# Patient Record
Sex: FEMALE | Race: BLACK OR AFRICAN AMERICAN | NOT HISPANIC OR LATINO | Employment: OTHER | URBAN - NONMETROPOLITAN AREA
[De-identification: names, ages, dates, MRNs, and addresses within clinical notes are randomized per-mention and may not be internally consistent; named-entity substitution may affect disease eponyms.]

---

## 2017-04-20 ENCOUNTER — APPOINTMENT (OUTPATIENT)
Dept: GENERAL RADIOLOGY | Facility: HOSPITAL | Age: 57
End: 2017-04-20

## 2017-04-20 ENCOUNTER — HOSPITAL ENCOUNTER (EMERGENCY)
Facility: HOSPITAL | Age: 57
Discharge: HOME | End: 2017-04-20
Attending: EMERGENCY MEDICINE | Admitting: EMERGENCY MEDICINE

## 2017-04-20 VITALS
TEMPERATURE: 97.6 F | HEART RATE: 47 BPM | WEIGHT: 169.5 LBS | DIASTOLIC BLOOD PRESSURE: 85 MMHG | BODY MASS INDEX: 28.24 KG/M2 | OXYGEN SATURATION: 100 % | RESPIRATION RATE: 18 BRPM | HEIGHT: 65 IN | SYSTOLIC BLOOD PRESSURE: 135 MMHG

## 2017-04-20 DIAGNOSIS — S39.012A BACK STRAIN, INITIAL ENCOUNTER: ICD-10-CM

## 2017-04-20 DIAGNOSIS — M54.6 ACUTE MIDLINE THORACIC BACK PAIN: Primary | ICD-10-CM

## 2017-04-20 DIAGNOSIS — R07.9 CHEST PAIN, UNSPECIFIED TYPE: ICD-10-CM

## 2017-04-20 LAB
ALBUMIN SERPL-MCNC: 4.4 G/DL (ref 3.4–4.8)
ALBUMIN/GLOB SERPL: 1.4 G/DL (ref 1.1–1.8)
ALP SERPL-CCNC: 89 U/L (ref 38–126)
ALT SERPL W P-5'-P-CCNC: 31 U/L (ref 9–52)
ANION GAP SERPL CALCULATED.3IONS-SCNC: 13 MMOL/L (ref 5–15)
AST SERPL-CCNC: 31 U/L (ref 14–36)
BASOPHILS # BLD AUTO: 0.04 10*3/MM3 (ref 0–0.2)
BASOPHILS NFR BLD AUTO: 0.6 % (ref 0–2)
BILIRUB SERPL-MCNC: 0.6 MG/DL (ref 0.2–1.3)
BUN BLD-MCNC: 16 MG/DL (ref 7–21)
BUN/CREAT SERPL: 21.1 (ref 7–25)
CALCIUM SPEC-SCNC: 8.7 MG/DL (ref 8.4–10.2)
CHLORIDE SERPL-SCNC: 105 MMOL/L (ref 95–110)
CK MB SERPL-CCNC: 0.47 NG/ML (ref 0–5)
CK SERPL-CCNC: 77 U/L (ref 30–135)
CO2 SERPL-SCNC: 20 MMOL/L (ref 22–31)
CREAT BLD-MCNC: 0.76 MG/DL (ref 0.5–1)
D-DIMER, QUANTITATIVE (MAD,POW, STR): 384 NG/ML (FEU) (ref 0–470)
DEPRECATED RDW RBC AUTO: 42.3 FL (ref 36.4–46.3)
EOSINOPHIL # BLD AUTO: 0.29 10*3/MM3 (ref 0–0.7)
EOSINOPHIL NFR BLD AUTO: 4.6 % (ref 0–7)
ERYTHROCYTE [DISTWIDTH] IN BLOOD BY AUTOMATED COUNT: 12.4 % (ref 11.5–14.5)
GFR SERPL CREATININE-BSD FRML MDRD: 96 ML/MIN/1.73 (ref 51–120)
GLOBULIN UR ELPH-MCNC: 3.2 GM/DL (ref 2.3–3.5)
GLUCOSE BLD-MCNC: 178 MG/DL (ref 60–100)
HCG SERPL QL: NEGATIVE
HCT VFR BLD AUTO: 39.5 % (ref 35–45)
HGB BLD-MCNC: 13.4 G/DL (ref 12–15.5)
HOLD SPECIMEN: NORMAL
HOLD SPECIMEN: NORMAL
IMM GRANULOCYTES # BLD: 0.01 10*3/MM3 (ref 0–0.02)
IMM GRANULOCYTES NFR BLD: 0.2 % (ref 0–0.5)
LYMPHOCYTES # BLD AUTO: 2.65 10*3/MM3 (ref 0.6–4.2)
LYMPHOCYTES NFR BLD AUTO: 42.2 % (ref 10–50)
MCH RBC QN AUTO: 31.9 PG (ref 26.5–34)
MCHC RBC AUTO-ENTMCNC: 33.9 G/DL (ref 31.4–36)
MCV RBC AUTO: 94 FL (ref 80–98)
MONOCYTES # BLD AUTO: 0.35 10*3/MM3 (ref 0–0.9)
MONOCYTES NFR BLD AUTO: 5.6 % (ref 0–12)
NEUTROPHILS # BLD AUTO: 2.94 10*3/MM3 (ref 2–8.6)
NEUTROPHILS NFR BLD AUTO: 46.8 % (ref 37–80)
NRBC BLD MANUAL-RTO: 0 /100 WBC (ref 0–0)
NT-PROBNP SERPL-MCNC: 35.9 PG/ML (ref 0–900)
PLATELET # BLD AUTO: 383 10*3/MM3 (ref 150–450)
PMV BLD AUTO: 9.7 FL (ref 8–12)
POTASSIUM BLD-SCNC: 3.4 MMOL/L (ref 3.5–5.1)
PROT SERPL-MCNC: 7.6 G/DL (ref 6.3–8.6)
RBC # BLD AUTO: 4.2 10*6/MM3 (ref 3.77–5.16)
SODIUM BLD-SCNC: 138 MMOL/L (ref 137–145)
TROPONIN I SERPL-MCNC: <0.012 NG/ML
TROPONIN I SERPL-MCNC: <0.012 NG/ML
WBC NRBC COR # BLD: 6.28 10*3/MM3 (ref 3.2–9.8)
WHOLE BLOOD HOLD SPECIMEN: NORMAL
WHOLE BLOOD HOLD SPECIMEN: NORMAL

## 2017-04-20 PROCEDURE — 99284 EMERGENCY DEPT VISIT MOD MDM: CPT

## 2017-04-20 PROCEDURE — 96374 THER/PROPH/DIAG INJ IV PUSH: CPT

## 2017-04-20 PROCEDURE — 83880 ASSAY OF NATRIURETIC PEPTIDE: CPT | Performed by: EMERGENCY MEDICINE

## 2017-04-20 PROCEDURE — 85025 COMPLETE CBC W/AUTO DIFF WBC: CPT | Performed by: EMERGENCY MEDICINE

## 2017-04-20 PROCEDURE — 96375 TX/PRO/DX INJ NEW DRUG ADDON: CPT

## 2017-04-20 PROCEDURE — 84703 CHORIONIC GONADOTROPIN ASSAY: CPT | Performed by: EMERGENCY MEDICINE

## 2017-04-20 PROCEDURE — 25010000002 KETOROLAC TROMETHAMINE PER 15 MG: Performed by: EMERGENCY MEDICINE

## 2017-04-20 PROCEDURE — 80053 COMPREHEN METABOLIC PANEL: CPT | Performed by: EMERGENCY MEDICINE

## 2017-04-20 PROCEDURE — 71020 HC CHEST PA AND LATERAL: CPT

## 2017-04-20 PROCEDURE — 82553 CREATINE MB FRACTION: CPT | Performed by: EMERGENCY MEDICINE

## 2017-04-20 PROCEDURE — 36415 COLL VENOUS BLD VENIPUNCTURE: CPT

## 2017-04-20 PROCEDURE — 25010000002 MORPHINE PER 10 MG: Performed by: EMERGENCY MEDICINE

## 2017-04-20 PROCEDURE — 25010000002 ONDANSETRON PER 1 MG: Performed by: EMERGENCY MEDICINE

## 2017-04-20 PROCEDURE — 82550 ASSAY OF CK (CPK): CPT | Performed by: EMERGENCY MEDICINE

## 2017-04-20 PROCEDURE — 93010 ELECTROCARDIOGRAM REPORT: CPT | Performed by: INTERNAL MEDICINE

## 2017-04-20 PROCEDURE — 85379 FIBRIN DEGRADATION QUANT: CPT | Performed by: EMERGENCY MEDICINE

## 2017-04-20 PROCEDURE — 84484 ASSAY OF TROPONIN QUANT: CPT | Performed by: EMERGENCY MEDICINE

## 2017-04-20 PROCEDURE — 93005 ELECTROCARDIOGRAM TRACING: CPT | Performed by: EMERGENCY MEDICINE

## 2017-04-20 PROCEDURE — 72072 X-RAY EXAM THORAC SPINE 3VWS: CPT

## 2017-04-20 RX ORDER — KETOROLAC TROMETHAMINE 30 MG/ML
30 INJECTION, SOLUTION INTRAMUSCULAR; INTRAVENOUS ONCE
Status: COMPLETED | OUTPATIENT
Start: 2017-04-20 | End: 2017-04-20

## 2017-04-20 RX ORDER — CYCLOBENZAPRINE HCL 10 MG
10 TABLET ORAL 3 TIMES DAILY PRN
Qty: 15 TABLET | Refills: 0 | Status: SHIPPED | OUTPATIENT
Start: 2017-04-20 | End: 2017-06-21

## 2017-04-20 RX ORDER — HYDROCODONE BITARTRATE AND ACETAMINOPHEN 5; 325 MG/1; MG/1
1 TABLET ORAL EVERY 6 HOURS PRN
Qty: 15 TABLET | Refills: 0 | Status: SHIPPED | OUTPATIENT
Start: 2017-04-20 | End: 2017-06-21

## 2017-04-20 RX ORDER — IBUPROFEN 600 MG/1
600 TABLET ORAL EVERY 8 HOURS PRN
Qty: 15 TABLET | Refills: 0 | Status: SHIPPED | OUTPATIENT
Start: 2017-04-20 | End: 2017-11-30

## 2017-04-20 RX ORDER — SODIUM CHLORIDE 0.9 % (FLUSH) 0.9 %
10 SYRINGE (ML) INJECTION AS NEEDED
Status: DISCONTINUED | OUTPATIENT
Start: 2017-04-20 | End: 2017-04-20 | Stop reason: HOSPADM

## 2017-04-20 RX ORDER — MORPHINE SULFATE 4 MG/ML
4 INJECTION, SOLUTION INTRAMUSCULAR; INTRAVENOUS ONCE
Status: COMPLETED | OUTPATIENT
Start: 2017-04-20 | End: 2017-04-20

## 2017-04-20 RX ORDER — LISINOPRIL 5 MG/1
10 TABLET ORAL DAILY
COMMUNITY

## 2017-04-20 RX ORDER — ONDANSETRON 2 MG/ML
4 INJECTION INTRAMUSCULAR; INTRAVENOUS ONCE
Status: COMPLETED | OUTPATIENT
Start: 2017-04-20 | End: 2017-04-20

## 2017-04-20 RX ORDER — ASPIRIN 325 MG
325 TABLET ORAL ONCE
Status: COMPLETED | OUTPATIENT
Start: 2017-04-20 | End: 2017-04-20

## 2017-04-20 RX ADMIN — KETOROLAC TROMETHAMINE 30 MG: 30 INJECTION, SOLUTION INTRAMUSCULAR; INTRAVENOUS at 10:34

## 2017-04-20 RX ADMIN — ASPIRIN 325 MG: 325 TABLET, COATED ORAL at 08:52

## 2017-04-20 RX ADMIN — MORPHINE SULFATE 4 MG: 4 INJECTION, SOLUTION INTRAMUSCULAR; INTRAVENOUS at 10:35

## 2017-04-20 RX ADMIN — ONDANSETRON 4 MG: 2 INJECTION INTRAMUSCULAR; INTRAVENOUS at 10:33

## 2017-04-20 NOTE — ED PROVIDER NOTES
Subjective   HPI Comments: Patient presents with two days of Upper T spine discomfort.  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  Yesterday morning the pain was more intense, patient notes she couldn't move secondary to the pain and worsening with movement.  She noted tightness in her chest and shortness of breath gradually with the pain to the point she thought she was having a heart attack.  Patient notes symptoms improved, but has had mild symptoms today as well.  No f/c.  Has had some associated nausea.  No known ASCAD.        History provided by:  Patient      Review of Systems   Constitutional: Negative.  Negative for appetite change, chills and fever.   HENT: Negative.  Negative for congestion.    Eyes: Negative.  Negative for photophobia and visual disturbance.   Respiratory: Positive for chest tightness and shortness of breath. Negative for cough.    Cardiovascular: Positive for chest pain. Negative for palpitations.   Gastrointestinal: Positive for nausea. Negative for abdominal pain, constipation, diarrhea and vomiting.   Endocrine: Negative.    Genitourinary: Negative.  Negative for decreased urine volume, dysuria, flank pain and hematuria.   Musculoskeletal: Positive for back pain (upper T spine). Negative for arthralgias, myalgias, neck pain and neck stiffness.   Skin: Negative.  Negative for pallor.   Neurological: Negative.  Negative for dizziness, syncope, weakness, light-headedness, numbness and headaches.   Psychiatric/Behavioral: Negative.  Negative for confusion and suicidal ideas. The patient is not nervous/anxious.        Past Medical History:   Diagnosis Date   • Asthma    • Hypertension        No Known Allergies    Past Surgical History:   Procedure Laterality Date   • NECK SURGERY      fusion   • TOE SURGERY     • TUBAL ABDOMINAL LIGATION         History reviewed. No pertinent family history.    Social History      Social History   • Marital status: Single     Spouse name: N/A   • Number of children: N/A   • Years of education: N/A     Social History Main Topics   • Smoking status: Light Tobacco Smoker   • Smokeless tobacco: None      Comment: 3/4 a day   • Alcohol use No   • Drug use: Yes     Special: Marijuana      Comment: occassional    • Sexual activity: Defer     Other Topics Concern   • None     Social History Narrative   • None           Objective   Physical Exam   Constitutional: She is oriented to person, place, and time. She appears well-developed and well-nourished. No distress.   HENT:   Head: Normocephalic and atraumatic.   Nose: Nose normal.   Mouth/Throat: Oropharynx is clear and moist.   Eyes: Conjunctivae and EOM are normal. No scleral icterus.   Neck: Normal range of motion. Neck supple. No JVD present.   Cardiovascular: Normal rate, regular rhythm, normal heart sounds and intact distal pulses.  Exam reveals no gallop and no friction rub.    No murmur heard.  Pulmonary/Chest: Effort normal. No respiratory distress. She has no wheezes. She has no rales. She exhibits no tenderness.   Abdominal: Soft. She exhibits no distension and no mass. There is no tenderness. There is no rebound and no guarding.   Musculoskeletal: She exhibits no edema, tenderness or deformity.   Reproducible back pain to the upper throacic spine.  TTP, midline.     Lymphadenopathy:     She has no cervical adenopathy.   Neurological: She is alert and oriented to person, place, and time. No cranial nerve deficit. She exhibits normal muscle tone.   Skin: Skin is warm and dry. No rash noted. She is not diaphoretic. No erythema. No pallor.   Psychiatric: She has a normal mood and affect. Her behavior is normal. Judgment and thought content normal.   Nursing note and vitals reviewed.      ECG 12 Lead    Date/Time: 4/20/2017 8:21 AM  Performed by: LADARIUS PEARSON  Authorized by: LADARIUS PEARSON   Conduction: right bundle branch  block  Clinical impression: abnormal ECG  Comments: Diffuse T wave inversion.  No ST elevation.                 ED Course  ED Course      Labs Reviewed   COMPREHENSIVE METABOLIC PANEL - Abnormal; Notable for the following:        Result Value    Glucose 178 (*)     Potassium 3.4 (*)     CO2 20.0 (*)     All other components within normal limits   TROPONIN (IN-HOUSE) - Normal   TROPONIN (IN-HOUSE) - Normal   BNP (IN-HOUSE) - Normal   HCG, SERUM, QUALITATIVE - Normal   CBC WITH AUTO DIFFERENTIAL - Normal   D-DIMER, QUANTITATIVE - Normal    Narrative:     Dimer values <500 ng/ml FEU are FDA approved as aid in diagnosis of deep venous thrombosis and pulmonary embolism.  This test should not be used in an exclusion strategy with pretest probability alone.    A recent guideline regarding diagnosis for pulmonary thomboembolism recommends an adjusted exclusion criterion of age x 10 ng/ml FEU for patients >50 years of age ([employer]ani Intern Med 2015; 163: 701-711).   CK - Normal   CK MB - Normal   RAINBOW DRAW    Narrative:     The following orders were created for panel order [place] Draw.  Procedure                               Abnormality         Status                     ---------                               -----------         ------                     Light Blue Top[[id number]]                                    Final result               Green Top (Gel)[[id number]]                                   Final result               Lavender Top[[id number]]                                      Final result               Gold Top - SST[[id number]]                                    Final result                 Please view results for these tests on the individual orders.   CBC AND DIFFERENTIAL    Narrative:     The following orders were created for panel order CBC & Differential.  Procedure                               Abnormality         Status                     ---------                               -----------         ------                      CBC Auto Differential[[id number]]         Normal              Final result                 Please view results for these tests on the individual orders.   LIGHT BLUE TOP   GREEN TOP   LAVENDER TOP   GOLD TOP - SST       XR Spine Thoracic 3 View   Final Result   CONCLUSION:    1.  Normal thoracic spine.          Electronically signed by:  Luc Hernandez MD  4/20/2017 9:56 AM CDT   Workstation: [id number]      XR Chest 2 View   Final Result   CONCLUSION:     No acute cardiopulmonary disease      Electronically signed by:  Lyle Sanderson MD  4/20/2017 8:48 AM CDT   Workstation: [id number]          HEART Score  History: Slightly suspicious (+0)  ECG: Non specific repolarization disturbance (+1)  Age: 45 through 65 (+1)  Risk Factors: 1 - 2 risk factors (+1)  Troponin: Normal limit or lower (+0)  Total: 3     Patient with atypical chest pain, mostly yesterday morning, with negative markers at 48 hours.  Patient does have atypical EKG without comparisons.  No f/c.  No cp/sob.  Patient without significant history of heart problems with smoking and hypertension risk factors.  Discussed with patient negative workup and will discharge if second set is negative.        Second set negative.  Outpatient followup.  Most likely back strain, which will be treated symptomaticly.  Patient encouarged to followup with cardiology for stress testing.  KRD#  [id number]          HEART score 3  MDM      Final diagnoses:   Acute midline thoracic back pain   Chest pain, unspecified type   Back strain, initial encounter            Garrett Hylton MD  04/20/17 9308

## 2017-04-20 NOTE — ED NOTES
Pt presents to the ED with complaints of mid to upper back pain that radiates through to chest. Pain has been present for 3 days.  Pain is described as sharp pain in back and pressure in center chest. 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  Pain is described as severe, but is getting better.      Destini Crain RN  04/20/17 5086

## 2017-04-20 NOTE — ED NOTES
Pt reports that pain is improving.  Pain level is now 5/10.     Destini Crain, RN  04/20/17 3151

## 2017-04-20 NOTE — ED NOTES
Pt requesting pain medication at this time. Dr. Hylton made aware.      Destini Crain, RN  04/20/17 1081

## 2017-04-20 NOTE — ED TRIAGE NOTES
"Patient presents to ED with complaints of mid/upper back pain and tightness in the chest. This started around 3 days ago, patient states, \"I felt like I was having a heart attack yesterday\". Patient complains of 8/10 pain.   "

## 2017-06-21 ENCOUNTER — OFFICE VISIT (OUTPATIENT)
Dept: CARDIOLOGY | Facility: CLINIC | Age: 57
End: 2017-06-21

## 2017-06-21 VITALS
SYSTOLIC BLOOD PRESSURE: 116 MMHG | HEART RATE: 63 BPM | BODY MASS INDEX: 27.67 KG/M2 | HEIGHT: 65 IN | OXYGEN SATURATION: 98 % | DIASTOLIC BLOOD PRESSURE: 70 MMHG | WEIGHT: 166.1 LBS

## 2017-06-21 DIAGNOSIS — I73.9 CLAUDICATION (HCC): ICD-10-CM

## 2017-06-21 DIAGNOSIS — R06.09 DYSPNEA ON EXERTION: ICD-10-CM

## 2017-06-21 DIAGNOSIS — R07.2 PRECORDIAL PAIN: Primary | ICD-10-CM

## 2017-06-21 DIAGNOSIS — F17.200 SMOKER: ICD-10-CM

## 2017-06-21 PROCEDURE — 99203 OFFICE O/P NEW LOW 30 MIN: CPT | Performed by: INTERNAL MEDICINE

## 2017-06-21 NOTE — PROGRESS NOTES
Cardiovascular Medicine      Jon Gil M.D., Ph.D., [facility]         No referring provider defined for this encounter.  Thank you for asking me to see Ana Luisa CHRISTY Levon for CP.    History of Present Illness  This is a 56 y.o. female with:    1. CP  2. Dyspnea  3. Claudication    Chest Pain/Dyspnea  Patient's referred from the emergency department for complaints of chest pain.  Patient actually presented to the emergency department after she had been lifting the night before.  Her initial complaints were actually upper T spine complaints.  She was having pain that was worsened by movement.  She also noticed some mild chest tightness.  It was nonexertional.  She had some mild dyspnea.  No other complaints.  She was seen in the emergency department.  EKG was normal.  2 troponins were normal.  She was arranged for outpatient follow-up.  No prior history of MI, congestive heart failure or coronary artery disease.  She's never had an ischemia evaluation. She has had no further CP since being in the ED.     Claudication  She has been having exertional LE pain. No ulcerations. She is a smoker.     Review of Systems - History obtained from chart review and the patient  General ROS: negative  Cardiovascular ROS: positive for - chest pain.  All other systems were reviewed and were negative.    family history is not on file.     reports that she has been smoking.  She does not have any smokeless tobacco history on file. She reports that she uses illicit drugs, including Marijuana. She reports that she does not drink alcohol.    No Known Allergies      Current Outpatient Prescriptions:   •  cyclobenzaprine (FLEXERIL) 10 MG tablet, Take 1 tablet by mouth 3 (Three) Times a Day As Needed for Muscle Spasms., Disp: 15 tablet, Rfl: 0  •  HYDROcodone-acetaminophen (NORCO) 5-325 MG per tablet, Take 1 tablet by mouth Every 6 (Six) Hours As Needed for Moderate Pain (4-6)., Disp: 15 tablet, Rfl: 0  •  ibuprofen (ADVIL,MOTRIN) 600  MG tablet, Take 1 tablet by mouth Every 8 (Eight) Hours As Needed for Mild Pain (1-3)., Disp: 15 tablet, Rfl: 0  •  lisinopril (PRINIVIL,ZESTRIL) 5 MG tablet, Take 5 mg by mouth Daily., Disp: , Rfl:     Physical Exam:  Vitals:    06/21/17 1423   BP: 116/70   Pulse:    SpO2:      Body mass index is 27.64 kg/(m^2).    GEN: alert, appears stated age and cooperative  Body Habitus: well-nourished  Neuro: CN II-XII grossly intact.   HEENT: Head: Normocephalic, no lesions, without obvious abnormality.  Neck / Thyroid: Supple, no masses, nodes, nodules or enlargement. No arcus senilis, xanthelasma or xanthomas. PERRL. Normal external ears. No drainage. No thyromegaly. Neck supple. No LAD. Trachea midline. Nose, normal.  JVP: 6 cm of water at 45 degrees HJR: absent      Carotid:  Upstroke: easily palpated bilaterally Volume: Normal.    Carotid Bruit:  None  Subclavian Bruit: Not present.    Lymph: No overt LAD.   Back: Normal.  Chest:  Normal Excursion: Good    I:E: Good  Pulmonary:clear to auscultation, no wheezes, rales or rhonchi, symmetric air entry. Equal chest excursion. Chest physical exam is normal. No tenderness.        Precordium:  No palpable heaves or thrusts. P2 is not palpable.   [place]:  normal size and placement Palpable S4: Not present.   Heart rate: normal  Heart Rhythm: regular     Heart Sounds: S1: normal intensity  S2: normal intensity  S3: absent   S4: absent  Opening Snap: absent  A2-OS:  N/A  Pericardial rub: absent    Ejection click: None      Murmurs: Systolic: none  Diastolic: none  Abdomen: Soft, non-tender, normal bowel sounds; no bruits, organomegaly or masses.  Extremity: no edema, peripheral pulses 2+ and symmetric  Pulses: Right radial artery has 2+ (normal) pulse and Left radial artery has 2+ (normal) pulse    DATA REVIEWED:     CXR: Mediastinum is normal, No infiltrate, No effusion, No cardiomegaly and No pneumothorax  EKG: Sinus with right bundle branch block.  Inferior T-wave inversions.    ED notes reviewed. Troponin reviewed and were negatibe.     Assessment/Plan      1. Chest pain syndrome/Dyspnea. The pain complaints are atypical.  The patient is Moderate Risk.  An ischemia evaluation is indicated. The patient is not able to exercise. Reason for inability to exercise: orthopedic condition(s): Back pain with prior fusion.. EKG is Abnormal:RBBB.   -Risks/Benefits discussed.   -A hand out was provided on the type of stress test. Questions answered.   -I have asked the patient to call 911 or be seen in the ED for further CP complaints.   -Will plan on further evaluation with Echocardiogram, MPS.  -PFTs    2. LE pain, unable to exclude claudication.   -ABIs    3. Tobacco status: Smoker.  I advised patient to quit, and offered support.  Educational material distributed.  less than 3 minutes      Plan for follow-up: 1 month APRN      EMR Dragon/Transcription disclaimer:     Much of this encounter note is an electronic transcription. This may permit erroneous, or at times, nonsensical words or phrases to be inadvertently transcribed; Although I have reviewed the note for such errors, some may still exist.

## 2017-06-28 LAB
BH CV ECHO MEAS - % IVS THICK: 41.7 %
BH CV ECHO MEAS - % LVPW THICK: 54.5 %
BH CV ECHO MEAS - ACS: 1.9 CM
BH CV ECHO MEAS - AO MAX PG (FULL): 2.3 MMHG
BH CV ECHO MEAS - AO MAX PG: 7 MMHG
BH CV ECHO MEAS - AO MEAN PG (FULL): 2 MMHG
BH CV ECHO MEAS - AO MEAN PG: 4 MMHG
BH CV ECHO MEAS - AO ROOT AREA: 4.2 CM^2
BH CV ECHO MEAS - AO ROOT DIAM: 2.3 CM
BH CV ECHO MEAS - AO V2 MAX: 132 CM/SEC
BH CV ECHO MEAS - AO V2 MEAN: 88 CM/SEC
BH CV ECHO MEAS - AO V2 VTI: 29 CM
BH CV ECHO MEAS - AVA(I,A): 3.2 CM^2
BH CV ECHO MEAS - AVA(I,D): 3.2 CM^2
BH CV ECHO MEAS - AVA(V,A): 3.4 CM^2
BH CV ECHO MEAS - AVA(V,D): 3.4 CM^2
BH CV ECHO MEAS - EDV(CUBED): 110.6 ML
BH CV ECHO MEAS - EDV(TEICH): 107.5 ML
BH CV ECHO MEAS - EF(CUBED): 82.2 %
BH CV ECHO MEAS - EF(TEICH): 74.9 %
BH CV ECHO MEAS - ESV(CUBED): 19.7 ML
BH CV ECHO MEAS - ESV(TEICH): 27 ML
BH CV ECHO MEAS - FS: 43.8 %
BH CV ECHO MEAS - IVS/LVPW: 1.1
BH CV ECHO MEAS - IVSD: 1.2 CM
BH CV ECHO MEAS - IVSS: 1.7 CM
BH CV ECHO MEAS - LA DIMENSION: 3.7 CM
BH CV ECHO MEAS - LA/AO: 1.6
BH CV ECHO MEAS - LV MASS(C)D: 206.4 GRAMS
BH CV ECHO MEAS - LV MASS(C)S: 173.1 GRAMS
BH CV ECHO MEAS - LV MAX PG: 4.7 MMHG
BH CV ECHO MEAS - LV MEAN PG: 2 MMHG
BH CV ECHO MEAS - LV V1 MAX: 108 CM/SEC
BH CV ECHO MEAS - LV V1 MEAN: 67.2 CM/SEC
BH CV ECHO MEAS - LV V1 VTI: 22.4 CM
BH CV ECHO MEAS - LVIDD: 4.8 CM
BH CV ECHO MEAS - LVIDS: 2.7 CM
BH CV ECHO MEAS - LVOT AREA (M): 4.2 CM^2
BH CV ECHO MEAS - LVOT AREA: 4.2 CM^2
BH CV ECHO MEAS - LVOT DIAM: 2.3 CM
BH CV ECHO MEAS - LVPWD: 1.1 CM
BH CV ECHO MEAS - LVPWS: 1.7 CM
BH CV ECHO MEAS - MR MAX PG: 74.3 MMHG
BH CV ECHO MEAS - MR MAX VEL: 431 CM/SEC
BH CV ECHO MEAS - MV A MAX VEL: 50.3 CM/SEC
BH CV ECHO MEAS - MV DEC SLOPE: 411 CM/SEC^2
BH CV ECHO MEAS - MV E MAX VEL: 98.2 CM/SEC
BH CV ECHO MEAS - MV E/A: 2
BH CV ECHO MEAS - MV P1/2T MAX VEL: 106 CM/SEC
BH CV ECHO MEAS - MV P1/2T: 75.5 MSEC
BH CV ECHO MEAS - MVA P1/2T LCG: 2.1 CM^2
BH CV ECHO MEAS - MVA(P1/2T): 2.9 CM^2
BH CV ECHO MEAS - PA MAX PG: 2.2 MMHG
BH CV ECHO MEAS - PA V2 MAX: 74.5 CM/SEC
BH CV ECHO MEAS - RAP SYSTOLE: 5 MMHG
BH CV ECHO MEAS - RVSP: 40.8 MMHG
BH CV ECHO MEAS - SV(AO): 120.5 ML
BH CV ECHO MEAS - SV(CUBED): 90.9 ML
BH CV ECHO MEAS - SV(LVOT): 93.1 ML
BH CV ECHO MEAS - SV(TEICH): 80.5 ML
BH CV ECHO MEAS - TR MAX VEL: 299 CM/SEC
LV EF 2D ECHO EST: 50 %

## 2017-06-30 ENCOUNTER — HOSPITAL ENCOUNTER (OUTPATIENT)
Dept: PULMONOLOGY | Facility: HOSPITAL | Age: 57
Discharge: HOME | End: 2017-06-30
Attending: INTERNAL MEDICINE | Admitting: INTERNAL MEDICINE

## 2017-06-30 ENCOUNTER — HOSPITAL ENCOUNTER (OUTPATIENT)
Dept: NUCLEAR MEDICINE | Facility: HOSPITAL | Age: 57
Discharge: HOME | End: 2017-06-30
Attending: INTERNAL MEDICINE

## 2017-06-30 ENCOUNTER — HOSPITAL ENCOUNTER (OUTPATIENT)
Dept: CARDIOLOGY | Facility: HOSPITAL | Age: 57
Discharge: HOME | End: 2017-06-30
Attending: INTERNAL MEDICINE

## 2017-06-30 PROCEDURE — 94729 DIFFUSING CAPACITY: CPT | Performed by: INTERNAL MEDICINE

## 2017-06-30 PROCEDURE — 94010 BREATHING CAPACITY TEST: CPT

## 2017-06-30 PROCEDURE — 78452 HT MUSCLE IMAGE SPECT MULT: CPT

## 2017-06-30 PROCEDURE — 0 TECHNETIUM SESTAMIBI: Performed by: INTERNAL MEDICINE

## 2017-06-30 PROCEDURE — 94729 DIFFUSING CAPACITY: CPT

## 2017-06-30 PROCEDURE — 94727 GAS DIL/WSHOT DETER LNG VOL: CPT

## 2017-06-30 PROCEDURE — 93017 CV STRESS TEST TRACING ONLY: CPT

## 2017-06-30 PROCEDURE — A9500 TC99M SESTAMIBI: HCPCS | Performed by: INTERNAL MEDICINE

## 2017-06-30 PROCEDURE — 94010 BREATHING CAPACITY TEST: CPT | Performed by: INTERNAL MEDICINE

## 2017-06-30 PROCEDURE — 78452 HT MUSCLE IMAGE SPECT MULT: CPT | Performed by: INTERNAL MEDICINE

## 2017-06-30 PROCEDURE — 25010000002 REGADENOSON 0.4 MG/5ML SOLUTION: Performed by: INTERNAL MEDICINE

## 2017-06-30 PROCEDURE — 94727 GAS DIL/WSHOT DETER LNG VOL: CPT | Performed by: INTERNAL MEDICINE

## 2017-06-30 PROCEDURE — 93018 CV STRESS TEST I&R ONLY: CPT | Performed by: INTERNAL MEDICINE

## 2017-06-30 PROCEDURE — 93016 CV STRESS TEST SUPVJ ONLY: CPT | Performed by: INTERNAL MEDICINE

## 2017-06-30 RX ORDER — 0.9 % SODIUM CHLORIDE 0.9 %
10 VIAL (ML) INJECTION AS NEEDED
Status: DISCONTINUED | OUTPATIENT
Start: 2017-06-30 | End: 2017-07-01 | Stop reason: HOSPADM

## 2017-06-30 RX ADMIN — Medication 1 DOSE: at 09:16

## 2017-06-30 RX ADMIN — Medication 1 DOSE: at 07:50

## 2017-06-30 RX ADMIN — REGADENOSON 0.4 MG: 0.08 INJECTION, SOLUTION INTRAVENOUS at 09:16

## 2017-06-30 RX ADMIN — SODIUM CHLORIDE 10 ML: 9 INJECTION INTRAMUSCULAR; INTRAVENOUS; SUBCUTANEOUS at 09:17

## 2017-07-03 LAB
BH CV STRESS BP STAGE 1: NORMAL
BH CV STRESS COMMENTS STAGE 1: NORMAL
BH CV STRESS DOSE REGADENOSON STAGE 1: 0.4
BH CV STRESS DURATION MIN STAGE 1: 0
BH CV STRESS DURATION SEC STAGE 1: 10
BH CV STRESS HR STAGE 1: 53
BH CV STRESS PROTOCOL 1: NORMAL
BH CV STRESS RECOVERY BP: NORMAL MMHG
BH CV STRESS RECOVERY HR: 70 BPM
BH CV STRESS STAGE 1: 1
LV EF NUC BP: 65 %
MAXIMAL PREDICTED HEART RATE: 166 BPM
PERCENT MAX PREDICTED HR: 57.83 %
STRESS BASELINE BP: NORMAL MMHG
STRESS BASELINE HR: 53 BPM
STRESS PERCENT HR: 68 %
STRESS POST ESTIMATED WORKLOAD: 1 METS
STRESS POST PEAK BP: NORMAL MMHG
STRESS POST PEAK HR: 96 BPM
STRESS TARGET HR: 141 BPM

## 2017-07-08 LAB
BH CV LOWER ARTERIAL LEFT ABI RATIO: 1.12
BH CV LOWER ARTERIAL LEFT DORSALIS PEDIS SYS MAX: 145 MMHG
BH CV LOWER ARTERIAL LEFT POST TIBIAL SYS MAX: 134 MMHG
BH CV LOWER ARTERIAL RIGHT ABI RATIO: 1.07
BH CV LOWER ARTERIAL RIGHT DORSALIS PEDIS SYS MAX: 138 MMHG
BH CV LOWER ARTERIAL RIGHT POST TIBIAL SYS MAX: 125 MMHG
UPPER ARTERIAL LEFT ARM BRACHIAL SYS MAX: 129 MMHG
UPPER ARTERIAL RIGHT ARM BRACHIAL SYS MAX: 128 MMHG

## 2017-07-10 ENCOUNTER — OFFICE VISIT (OUTPATIENT)
Dept: CARDIOLOGY | Facility: CLINIC | Age: 57
End: 2017-07-10

## 2017-07-10 VITALS
WEIGHT: 165.25 LBS | DIASTOLIC BLOOD PRESSURE: 70 MMHG | BODY MASS INDEX: 27.53 KG/M2 | SYSTOLIC BLOOD PRESSURE: 114 MMHG | HEIGHT: 65 IN | OXYGEN SATURATION: 98 % | HEART RATE: 70 BPM

## 2017-07-10 DIAGNOSIS — R07.9 CHEST PAIN, UNSPECIFIED TYPE: Primary | ICD-10-CM

## 2017-07-10 PROCEDURE — 99213 OFFICE O/P EST LOW 20 MIN: CPT | Performed by: NURSE PRACTITIONER

## 2017-07-10 RX ORDER — ASPIRIN 81 MG/1
81 TABLET ORAL DAILY
COMMUNITY
End: 2017-11-30

## 2017-07-10 NOTE — PROGRESS NOTES
Subjective:     precordial pain (chief complaint )      History of Present Illness  Chest Pain/Dyspnea  Patient's referred from the emergency department for complaints of chest pain.  Patient actually presented to the emergency department after she had been lifting the night before.  Her initial complaints were actually upper T spine complaints.  She was having pain that was worsened by movement.  She also noticed some mild chest tightness.  It was nonexertional.  She had some mild dyspnea.  No other complaints.  She was seen in the emergency department.  EKG was normal.  2 troponins were normal.  She was arranged for outpatient follow-up.  No prior history of MI, congestive heart failure or coronary artery disease. She has had no further CP since being in the ED.     Claudication  She has been having exertional LE pain. No ulcerations. She is a smoker. Resting TIARRA's normal. The pain is not limiting. She proceeds with all daily activities without rest.     Review of Systems   Constitution: Negative for chills, decreased appetite, fever and weakness.   HENT: Negative.    Eyes: Negative.    Cardiovascular: Positive for claudication. Negative for chest pain, dyspnea on exertion, irregular heartbeat, leg swelling and palpitations.   Respiratory: Negative for cough, shortness of breath and wheezing.    Endocrine: Negative.    Skin: Negative for dry skin, flushing and rash.   Musculoskeletal: Negative for falls and myalgias.   Gastrointestinal: Positive for constipation. Negative for abdominal pain and melena.   Genitourinary: Negative for frequency and hematuria.   Neurological: Negative for dizziness, light-headedness and loss of balance.   Psychiatric/Behavioral: Negative for altered mental status and memory loss. The patient is not nervous/anxious.        Current Outpatient Prescriptions   Medication Sig Dispense Refill   • aspirin 81 MG EC tablet Take 81 mg by mouth Daily.     • ibuprofen (ADVIL,MOTRIN) 600 MG tablet  "Take 1 tablet by mouth Every 8 (Eight) Hours As Needed for Mild Pain (1-3). 15 tablet 0   • lisinopril (PRINIVIL,ZESTRIL) 5 MG tablet Take 5 mg by mouth Daily.       No current facility-administered medications for this visit.         Objective:     Vitals:    07/10/17 1345   BP: 114/70   BP Location: Left arm   Patient Position: Sitting   Cuff Size: Adult   Pulse: 70   SpO2: 98%   Weight: 165 lb 4 oz (75 kg)   Height: 65\" (165.1 cm)          Physical Exam   Constitutional: She is oriented to person, place, and time. She appears well-developed and well-nourished. No distress.   HENT:   Head: Normocephalic.   Neck: No JVD present.   Cardiovascular: Normal rate, regular rhythm, S1 normal, S2 normal, normal heart sounds and intact distal pulses.    No murmur heard.  Pulmonary/Chest: Effort normal and breath sounds normal. No respiratory distress. She has no wheezes. She has no rales.   Abdominal: Soft. Bowel sounds are normal.   Musculoskeletal: Normal range of motion. She exhibits no edema.   Neurological: She is alert and oriented to person, place, and time.   Skin: Skin is warm and dry. No erythema.   Psychiatric: She has a normal mood and affect. Her behavior is normal. Judgment and thought content normal.       Cardiographics  Echocardiogram: 2017  Interpretation Summary   · Left Ventricle: Left ventricular systolic function is low normal. Estimated EF = 50%  · Left ventricular wall thickness is consistent with mild concentric hypertrophy  · Left ventricular diastolic function is normal.  · Right Ventricle: Normal right ventricular cavity size, wall thickness, systolic function and septal motion noted  · No evidence of pulmonary hypertension is present  · There is no evidence of pericardial effusion.     Stress Testin2017  Interpretation Summary   · Nuclear Study Description: A 1-day rest/stress protocol myocardial perfusion imaging study was performed  · Nuclear Perfusion Images: Overall image " quality is excellent  · Stress Description: A pharmacological stress test was performed using regadenoson without low-level exercise.  · Stress Findings: No ECG evidence of myocardial ischemia  · Study Impression: Myocardial perfusion imaging indicates a normal myocardial perfusion study with no evidence of ischemia.  · Ventricle Size / Description: Left ventricular ejection fraction is normal (Calculated EF = 65%).  · Impressions are consistent with a low risk study     PFT's: 6/30/2017  Conclusion  Mild obstruction and restriction. Mildly reduced diffusing capacity.  FEV1: 89%    TIARRA's: 6/28/2017  Interpretation Summary   · Right Conclusion: The right TIARRA is normal.  · Left Conclusion: The left TIARRA is normal.          Right Ratio Left Ratio   Calf           TIARRA 1.07        1.12            Imaging  Chest x-ray:    Lab Review   Results for NIKOLAI RANGEL (MRN [id number]) as of 7/10/2017 15:23   Ref. Range 4/20/2017 08:21   Creatine Kinase Latest Ref Range: 30 - 135 U/L 77   CKMB Latest Ref Range: 0.00 - 5.00 ng/mL 0.47   Troponin I Latest Ref Range: <=0.034 ng/mL <0.012   proBNP Latest Ref Range: 0.0 - 900.0 pg/mL 35.9   Glucose Latest Ref Range: 60 - 100 mg/dL 178 (H)   Sodium Latest Ref Range: 137 - 145 mmol/L 138   Potassium Latest Ref Range: 3.5 - 5.1 mmol/L 3.4 (L)   CO2 Latest Ref Range: 22.0 - 31.0 mmol/L 20.0 (L)   Chloride Latest Ref Range: 95 - 110 mmol/L 105   Anion Gap Latest Ref Range: 5.0 - 15.0 mmol/L 13.0   Creatinine Latest Ref Range: 0.50 - 1.00 mg/dL 0.76   BUN Latest Ref Range: 7 - 21 mg/dL 16   BUN/Creatinine Ratio Latest Ref Range: 7.0 - 25.0  21.1   Calcium Latest Ref Range: 8.4 - 10.2 mg/dL 8.7   eGFR  African Amer Latest Ref Range: 51 - 120 mL/min/1.73 96   Alkaline Phosphatase Latest Ref Range: 38 - 126 U/L 89   Total Protein Latest Ref Range: 6.3 - 8.6 g/dL 7.6   ALT (SGPT) Latest Ref Range: 9 - 52 U/L 31   AST (SGOT) Latest Ref Range: 14 - 36 U/L 31   Total Bilirubin Latest Ref Range:  0.2 - 1.3 mg/dL 0.6   Albumin Latest Ref Range: 3.40 - 4.80 g/dL 4.40   Globulin Latest Ref Range: 2.3 - 3.5 gm/dL 3.2   A/G Ratio Latest Ref Range: 1.1 - 1.8 g/dL 1.4   HCG Qualitative Latest Ref Range: Negative  Negative   D-dimer, Quant Latest Ref Range: 0 - 470 ng/mL (FEU) 384   WBC Latest Ref Range: 3.20 - 9.80 10*3/mm3 6.28   RBC Latest Ref Range: 3.77 - 5.16 10*6/mm3 4.20   Hemoglobin Latest Ref Range: 12.0 - 15.5 g/dL 13.4   Hematocrit Latest Ref Range: 35.0 - 45.0 % 39.5   RDW Latest Ref Range: 11.5 - 14.5 % 12.4   MCV Latest Ref Range: 80.0 - 98.0 fL 94.0   MCH Latest Ref Range: 26.5 - 34.0 pg 31.9   MCHC Latest Ref Range: 31.4 - 36.0 g/dL 33.9   MPV Latest Ref Range: 8.0 - 12.0 fL 9.7   Platelets Latest Ref Range: 150 - 450 10*3/mm3 383     The following portions of the patient's history were reviewed and updated as appropriate: allergies, current medications, past family history, past medical history, past social history, past surgical history and problem list.     Assessment/Plan:      Diagnosis Plan   1. Chest pain, absent now. Non-cardiac Chest pain with low risk MPS. Discussed the possibility of GERD. She agreed that pain occurs in evening and could be source. She has taken Pepto with relief. Will try Zantac/Pepcid/Tagamet once or twice a day to see if relief. She has been chest pain free since ER visit.    Educated on BP control with Mild LVH on echo. She takes Lisinopril 5mg sometimes. Takes her BP at home. Controlled today.    Smoking cessation due to PFT results.  She agreed. Has cut back substantially since seeing Dr. Gil.     MPS low risk. Encouraged daily ASA.     TIARRA's normal at rest. However, she does endorse pain with exercise. If pain continues, or becomes limiting will repeat exercise TIARRA's. She was instructed of these s/s.     Activity: 30min a day, 3x week.  Diet: Heart healthy foods- less salt, less processed food, fish, whole grains, fruits and veggies. No trans fat. No  saturated fat.  Smoking cessation  Diabetes management  Blood pressure management  Weight management  Stress management       2. Constipation She complained mostly of constipation today. Encouraged lots of hydration, fiber supplements, and OTC stool softeners. Discouraged the use of laxatives that she has been using.     Daily Miralax would be acceptable.           Follow up as needed should claudications symptoms return or worsen.

## 2017-08-04 ENCOUNTER — APPOINTMENT (OUTPATIENT)
Dept: LAB | Facility: HOSPITAL | Age: 57
End: 2017-08-04

## 2017-08-04 ENCOUNTER — OFFICE VISIT (OUTPATIENT)
Dept: PAIN MEDICINE | Facility: CLINIC | Age: 57
End: 2017-08-04

## 2017-08-04 VITALS
DIASTOLIC BLOOD PRESSURE: 82 MMHG | SYSTOLIC BLOOD PRESSURE: 120 MMHG | WEIGHT: 167.9 LBS | HEIGHT: 65 IN | BODY MASS INDEX: 27.97 KG/M2

## 2017-08-04 DIAGNOSIS — M25.552 BILATERAL HIP PAIN: ICD-10-CM

## 2017-08-04 DIAGNOSIS — M25.551 BILATERAL HIP PAIN: ICD-10-CM

## 2017-08-04 DIAGNOSIS — M54.5 CHRONIC LOW BACK PAIN, UNSPECIFIED BACK PAIN LATERALITY, WITH SCIATICA PRESENCE UNSPECIFIED: ICD-10-CM

## 2017-08-04 DIAGNOSIS — Z79.899 HIGH RISK MEDICATIONS (NOT ANTICOAGULANTS) LONG-TERM USE: ICD-10-CM

## 2017-08-04 DIAGNOSIS — G89.29 CHRONIC LOW BACK PAIN, UNSPECIFIED BACK PAIN LATERALITY, WITH SCIATICA PRESENCE UNSPECIFIED: ICD-10-CM

## 2017-08-04 DIAGNOSIS — M47.817 LUMBOSACRAL SPONDYLOSIS WITHOUT MYELOPATHY: Primary | ICD-10-CM

## 2017-08-04 DIAGNOSIS — M79.18 MYOFACIAL MUSCLE PAIN: ICD-10-CM

## 2017-08-04 DIAGNOSIS — M47.812 CERVICAL SPONDYLOSIS WITHOUT MYELOPATHY: ICD-10-CM

## 2017-08-04 DIAGNOSIS — M54.2 CERVICALGIA: ICD-10-CM

## 2017-08-04 PROCEDURE — 99204 OFFICE O/P NEW MOD 45 MIN: CPT | Performed by: PAIN MEDICINE

## 2017-08-04 PROCEDURE — G0481 DRUG TEST DEF 8-14 CLASSES: HCPCS | Performed by: NURSE PRACTITIONER

## 2017-08-04 PROCEDURE — 80307 DRUG TEST PRSMV CHEM ANLYZR: CPT | Performed by: NURSE PRACTITIONER

## 2017-08-04 RX ORDER — MELOXICAM 15 MG/1
7.5 TABLET ORAL DAILY
Qty: 30 TABLET | Refills: 1 | Status: SHIPPED | OUTPATIENT
Start: 2017-08-04 | End: 2018-04-24

## 2017-08-04 RX ORDER — TIZANIDINE 4 MG/1
2 TABLET ORAL 2 TIMES DAILY PRN
Qty: 60 TABLET | Refills: 1 | Status: SHIPPED | OUTPATIENT
Start: 2017-08-04 | End: 2017-09-03
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
## 2017-08-11 LAB — CONV REPORT SUMMARY: NORMAL

## 2017-08-14 ENCOUNTER — HOSPITAL ENCOUNTER (OUTPATIENT)
Dept: PHYSICAL THERAPY | Facility: HOSPITAL | Age: 57
Setting detail: THERAPIES SERIES
Discharge: HOME | End: 2017-08-14

## 2017-08-15 ENCOUNTER — HOSPITAL ENCOUNTER (OUTPATIENT)
Dept: PHYSICAL THERAPY | Facility: HOSPITAL | Age: 57
Setting detail: THERAPIES SERIES
Discharge: HOME | End: 2017-08-15

## 2017-08-15 DIAGNOSIS — M47.817 LUMBOSACRAL SPONDYLOSIS WITHOUT MYELOPATHY: Primary | ICD-10-CM

## 2017-08-15 DIAGNOSIS — M54.2 CERVICALGIA: ICD-10-CM

## 2017-08-15 DIAGNOSIS — M25.552 BILATERAL HIP PAIN: ICD-10-CM

## 2017-08-15 DIAGNOSIS — M79.18 MYOFACIAL MUSCLE PAIN: ICD-10-CM

## 2017-08-15 DIAGNOSIS — M25.551 BILATERAL HIP PAIN: ICD-10-CM

## 2017-08-15 DIAGNOSIS — M54.5 CHRONIC LOW BACK PAIN, UNSPECIFIED BACK PAIN LATERALITY, WITH SCIATICA PRESENCE UNSPECIFIED: ICD-10-CM

## 2017-08-15 DIAGNOSIS — G89.29 CHRONIC LOW BACK PAIN, UNSPECIFIED BACK PAIN LATERALITY, WITH SCIATICA PRESENCE UNSPECIFIED: ICD-10-CM

## 2017-08-15 PROCEDURE — 97162 PT EVAL MOD COMPLEX 30 MIN: CPT | Performed by: PHYSICAL THERAPIST

## 2017-08-15 NOTE — THERAPY EVALUATION
"    Outpatient Physical Therapy Ortho Initial Evaluation  [facility]     Patient Name: Ana Luisa Dos Santos  : 1960  MRN: [id number]  Today's Date: 8/15/2017      Visit Date: 08/15/2017  Attendance:  (authorization required)  Subjective Improvement: n/a  Next MD Appt: 10/3/17  Recert Date: 17    Therapy Diagnosis: chronic neck and back pain    Patient Active Problem List   Diagnosis   • Chest pain        Past Medical History:   Diagnosis Date   • Anxiety    • Arthritis    • Asthma    • Asthma    • Chest pain    • High blood pressure    • Hypertension    • Migraine         Past Surgical History:   Procedure Laterality Date   • NECK SURGERY      fusion C5-6   • TOE SURGERY     • TUBAL ABDOMINAL LIGATION         Visit Dx:     ICD-10-CM ICD-9-CM   1. Lumbosacral spondylosis without myelopathy M47.817 721.3   2. Chronic low back pain, unspecified back pain laterality, with sciatica presence unspecified M54.5 724.2    G89.29 338.29   3. Cervicalgia M54.2 723.1   4. Myofacial muscle pain M79.1 729.1   5. Bilateral hip pain M25.551 719.45    M25.552              Patient History       08/15/17 0900          History    Chief Complaint Pain  -SS      Type of Pain Hip pain;Back pain;Neck pain  -SS      Date Current Problem(s) Began --   chronic  -SS      Brief Description of Current Complaint Patient reports history of chronic pain in the neck, back, and hips. She has a history of fusion at C5-6 . She reports pain in the interscapular region down through the lumbar spine to the hips. Patient reports that her legs hurt with prolonged standing or walking. She has to work to find a comfortable position to sleep in. She reports tingling in the bilateral hands. \"I just know that I have aches and pains, and I'm tired of hurting.\"  female with children. Resides in a single story house with 1 step to enter the rear of the house and 5 steps to enter the front. There are no stairs inside the house. She reports " "that it hurts her legs and knees and makes her out of breath to ascend a lot of stairs/steps.  -SS      Patient/Caregiver Goals --   \"Feel better.\"  -SS      Current Tobacco Use smoker  -SS      Smoking Status 0.5ppd  -SS      Patient's Rating of General Health Good  -SS      Hand Dominance right-handed  -SS      Occupation/sports/leisure activities Unemployed/disabled. Hobbies: none per patient report  -SS      What clinical tests have you had for this problem? X-ray  -      Results of Clinical Tests see report in EMR  -SS      Pain     Pain Location Back;Hip;Neck  -SS      Pain at Present 6  -SS      Pain at Best 6   over past month  -SS      Pain at Worst 6   over the past month  -SS      Pain Frequency Constant/continuous  -SS      Pain Description Heaviness;Sharp   needles, \"Like something is sticking me\"  -SS      What Performance Factors Make the Current Problem(s) WORSE? sweeping, twisting motions, prolonged standing/walking  -SS      What Performance Factors Make the Current Problem(s) BETTER? medications; trying to find a comfortable position in bed; heat  -SS      Is your sleep disturbed? Yes  -SS      Is medication used to assist with sleep? Yes  -SS      Difficulties at work? n/a  -SS      Difficulties with ADL's? pain and difficulty completing  -SS      Difficulties with recreational activities? n/a  -SS      Fall Risk Assessment    Any falls in the past year: No  -SS      Does patient have a fear of falling Yes (comment)   \"I think if I'm gonna fall that I'm gonna break something.\"  -SS      Daily Activities    Primary Language English  -      Safety    Are you being hurt, hit, or frightened by anyone at home or in your life? No  -SS      Are you being neglected by a caregiver No  -SS        User Key  (r) = Recorded By, (t) = Taken By, (c) = Cosigned By    Initials Name Provider Type    SS Bishop Wright, PT DPT Physical Therapist                PT Ortho       08/15/17 0900    Subjective " "Comments    Subjective Comments see Therapy Patient History  -SS    Precautions and Contraindications    Precautions/Limitations no known precautions/limitations  -SS    Subjective Pain    Able to rate subjective pain? yes  -SS    Pre-Treatment Pain Level 6  -SS    Post-Treatment Pain Level 7  -SS    Posture/Observations    Alignment Options Forward head;Lumbar lordosis  -SS    Forward Head Mild  -SS    Lumbar lordosis Increased   increased lower lumbar  -SS    Posture/Observations Comments anterior pelvic tilt; slow, non-antalgic gait  -SS    Special Tests/Palpation    Special Tests/Palpation Lumbar/SI;Cervical/Thoracic   decreased pain with manual distraction   -    Cervical Palpation    Cervical Palpation- Location? --   diffusely tender cervical paraspinals & UT B w/ increased tone  -    Lumbosacral Palpation    SI --   upslip R hemipelvis  -SS    Lumbosacral Segment Bilateral:;Guarded/taut  -SS    Thoracolumbar Segment Bilateral:;Guarded/taut  -SS    Piriformis Left:;Tender  -SS    Gluteus Zachary Bilateral:   non-tender  -SS    Quadratus Lumborum Bilateral:;Tender  -SS    Neck    Flexion AROM Deficit 20   pulling on posterior neck  -SS    Extension AROM Deficit 40   pulling anterior neck  -SS    Lt Lat Flexion AROM Deficit 20   pulling contralateral neck  -SS    Rt Lateral Flexion AROM Deficit 20   pulling contralateral neck  -SS    Lt Rotation AROM Deficit 45   \"pulling my back and shoulder\"  -SS    Rt Rotation AROM Deficit 45   pulls on R shoulder/CT junction  -SS    Trunk    Flexion AROM Deficit fingertips to mid-shin   pain in anterior hips and thighs  -SS    Extension AROM Deficit WFLs  -SS    Lt Lat Flexion AROM Deficit fingertips 2\" proximal to knee lateral joint line   pain L low back  -SS    Right Lateral Flexion AROM Deficit fingertips to knee lateral joint line   pain across low back  -SS    Left Shoulder    Flexion Gross Movement (4+/5) good plus   pain thoracolumbar junction  -SS    Extension " Gross Movement (4+/5) good plus  -SS    ABduction Gross Movement (4+/5) good plus   shoulder pain  -SS    Int Rotation Gross Movement (5/5) normal  -SS    Ext Rotation Gross Movement (4/5) good  -SS    Right Shoulder    Flexion Gross Movement (4+/5) good plus   thoracolumbar junction pain  -SS    Extension Gross Movement (4+/5) good plus  -SS    ABduction Gross Movement (4+/5) good plus   shoulder pain  -SS    Int Rotation Gross Movement (5/5) normal  -SS    Ext Rotation Gross Movement (4/5) good  -SS    Left Hip    Hip Flexion Gross Movement (5/5) normal  -SS    Hip Extension Gross Movement (4/5) good  -SS    Hip ABduction Gross Movement (4/5) good  -SS    Hip ADduction Gross Movement (4+/5) good plus  -SS    Hip Int (Medial) Rotation Gross Movement (5/5) normal  -SS    Hip Ext (Lat) Rotation Gross Movement (5/5) normal  -SS    Right Hip    Hip Flexion Gross Movement (5/5) normal  -SS    Hip Extension Gross Movement (4/5) good  -SS    Hip ABduction Gross Movement (4/5) good  -SS    Hip ADduction Gross Movement (4+/5) good plus  -SS    Hip Int (Medial) Rotation Gross Movement (5/5) normal  -SS    Hip Ext (Lat) Rotation Gross Movement (5/5) normal  -SS    Left Elbow/Forearm    Elbow Flexion Gross Movement (5/5) normal  -SS    Elbow Extension Gross Movement (5/5) normal  -SS    Right Elbow/Forearm    Elbow Flexion Gross Movement (5/5) normal  -SS    Elbow Extension Gross Movement (5/5) normal  -SS    Left Wrist    Wrist Flexion Gross Movement (5/5) normal  -SS    Wrist Extension Gross Movement (5/5) normal  -SS    Right Wrist    Wrist Flexion Gross Movement (5/5) normal  -SS    Wrist Extension Gross Movement (5/5) normal  -SS    Left Knee    Knee Extension Gross Movement (5/5) normal  -SS    Knee Flexion Gross Movement (5/5) normal  -SS    Right Knee    Knee Extension Gross Movement (5/5) normal  -SS    Knee Flexion Gross Movement (5/5) normal  -SS    Left Ankle/Foot    Ankle PF Gross Movement (5/5) normal  -SS     Ankle Dorsiflexion Gross Movement (5/5) normal  -SS    Right Ankle/Foot    Ankle PF Gross Movement (5/5) normal  -SS    Ankle Dorsiflexion Gross Movement (5/5) normal  -SS      User Key  (r) = Recorded By, (t) = Taken By, (c) = Cosigned By    Initials Name Provider Type    SS Bishop Wright, PT DPT Physical Therapist                            Therapy Education       08/15/17 0900          Therapy Education    Given Other (comment)   proposed therapy POC and rationale  -SS      Program New  -SS      How Provided Verbal  -SS      Provided to Patient  -SS      Level of Understanding Verbalized  -SS        User Key  (r) = Recorded By, (t) = Taken By, (c) = Cosigned By    Initials Name Provider Type    SS Bishop Wright, PT DPT Physical Therapist                PT OP Goals       08/15/17 0900       PT Short Term Goals    STG Date to Achieve 09/05/17  -     STG 1 Note a >/= 25% subjective improvement.  -SS     STG 2 Modified Oswestry Low Back Disability Questionnaire score to be </= 40%  -     STG 3 Complete >/= 30 minutes of aquatic therex with pain increase no greater than 2 compared to start of treatment on a 0-10 scale.  -     Long Term Goals    LTG Date to Achieve 09/26/17  -SS     LTG 1 Independent with HEP  -SS     LTG 2 Modified Oswestry Low Back Disability Questionnaire score to be </= 34%  -     LTG 3 B hip strength 5/5 in all planes.  -     LTG 4 Minimal increase in pain ascending and descending 5 stairs.  -     Time Calculation    PT Goal Re-Cert Due Date 09/05/17  -       User Key  (r) = Recorded By, (t) = Taken By, (c) = Cosigned By    Initials Name Provider Type    MAICOL Wright, PT DPT Physical Therapist                PT Assessment/Plan       08/15/17 0900       PT Assessment    Assessment Comments Authorization for treatment required. Patient has an expressed fear of water. She is willing to attempt aquatics, but prefers not deep water.   -SS     Rehab Potential  Fair   barrier: chronicity  -SS     Patient/caregiver participated in establishment of treatment plan and goals Yes  -SS     Patient would benefit from skilled therapy intervention Yes  -SS     PT Plan    PT Frequency 2x/week  -SS     Predicted Duration of Therapy Intervention (days/wks) 4-6 weeks  -     Planned CPT's? PT EVAL MOD COMPLEXITY: 61373;PT THER PROC EA 15 MIN: 80305;PT MANUAL THERAPY EA 15 MIN: 98909;PT HOT OR COLD PACK TREAT MCARE;PT ELECTRICAL STIM UNATTEND: ;PT THER SUPP EA 15 MIN;PT TRACTION LUMBAR: 50568  -     Physical Therapy Interventions (Optional Details) aquatics exercise;home exercise program;joint mobilization;lumbar stabilization;manual therapy techniques;modalities;patient/family education;strengthening;stretching  -     PT Plan Comments Attempt aquatics to work on stretching and strengthening. If progressing with aquatics, will add land therapy for stretching of U/LE, cervical/trunk/core muscle strengthening, LE strengthening, trial of mechanical lumbar traction, IFC estim with or without MHP for pain  -       User Key  (r) = Recorded By, (t) = Taken By, (c) = Cosigned By    Initials Name Provider Type     Bishop Wright, PT DPT Physical Therapist                                    Outcome Measures       08/15/17 1300          Modified Oswestry    Modified Oswestry Score/Comments 22/50 = 44%  -      Functional Assessment    Outcome Measure Options Modified [place]  -        User Key  (r) = Recorded By, (t) = Taken By, (c) = Cosigned By    Initials Name Provider Type     Bishop Wright, JESS DPT Physical Therapist            Time Calculation:   Start Time: 0937  Stop Time: 1018  Time Calculation (min): 41 min     Therapy Charges for Today     Code Description Service Date Service Provider Modifiers Qty    [id number]  PT EVAL MOD COMPLEXITY 3 8/15/2017 Bishop Wright, PT DPT GP 1                   Bishop Wright, PT, DPT, CHT  8/15/2017

## 2017-08-23 ENCOUNTER — HOSPITAL ENCOUNTER (OUTPATIENT)
Dept: PHYSICAL THERAPY | Facility: HOSPITAL | Age: 57
Setting detail: THERAPIES SERIES
Discharge: HOME | End: 2017-08-23

## 2017-08-23 DIAGNOSIS — M79.18 MYOFACIAL MUSCLE PAIN: ICD-10-CM

## 2017-08-23 DIAGNOSIS — M47.817 LUMBOSACRAL SPONDYLOSIS WITHOUT MYELOPATHY: Primary | ICD-10-CM

## 2017-08-23 DIAGNOSIS — M54.5 CHRONIC LOW BACK PAIN, UNSPECIFIED BACK PAIN LATERALITY, WITH SCIATICA PRESENCE UNSPECIFIED: ICD-10-CM

## 2017-08-23 DIAGNOSIS — M25.551 BILATERAL HIP PAIN: ICD-10-CM

## 2017-08-23 DIAGNOSIS — M54.2 CERVICALGIA: ICD-10-CM

## 2017-08-23 DIAGNOSIS — G89.29 CHRONIC LOW BACK PAIN, UNSPECIFIED BACK PAIN LATERALITY, WITH SCIATICA PRESENCE UNSPECIFIED: ICD-10-CM

## 2017-08-23 DIAGNOSIS — M25.552 BILATERAL HIP PAIN: ICD-10-CM

## 2017-08-23 PROCEDURE — 97110 THERAPEUTIC EXERCISES: CPT

## 2017-08-23 NOTE — THERAPY TREATMENT NOTE
Outpatient Physical Therapy Ortho Treatment Note  [facility]     Patient Name: Ana Luisa Dos Santos  : 1960  MRN: [id number]  Today's Date: 2017      Visit Date: 2017    Sub imp 0%  Visit 2/2 (6)  MD 10/31/07  RE 17    Visit Dx:    ICD-10-CM ICD-9-CM   1. Lumbosacral spondylosis without myelopathy M47.817 721.3   2. Chronic low back pain, unspecified back pain laterality, with sciatica presence unspecified M54.5 724.2    G89.29 338.29   3. Cervicalgia M54.2 723.1   4. Myofacial muscle pain M79.1 729.1   5. Bilateral hip pain M25.551 719.45    M25.552        Patient Active Problem List   Diagnosis   • Chest pain        Past Medical History:   Diagnosis Date   • Anxiety    • Arthritis    • Asthma    • Asthma    • Chest pain    • High blood pressure    • Hypertension    • Migraine         Past Surgical History:   Procedure Laterality Date   • NECK SURGERY      fusion C5-6   • TOE SURGERY     • TUBAL ABDOMINAL LIGATION                               PT Assessment/Plan       17 1106       PT Assessment    Assessment Comments (P)  Ex sheets for new HEP, Demo'ed appropriately.   -MAURICIO     PT Plan    PT Plan Comments (P)  Cont per POC. Attempt aquatics only for next 3 visits  -MAURICIO       User Key  (r) = Recorded By, (t) = Taken By, (c) = Cosigned By    Initials Name Provider Type    MAURICIO Fox, [place]                 Modalities       17 1000          Moist Heat    MH Applied (P)  Yes  -MAURICIO      Location (P)  LB/Neck  -MAURICIO      Rx Minutes (P)  15 mins  -MAURICIO      MH S/P Rx (P)  Yes  -MAURICIO        User Key  (r) = Recorded By, (t) = Taken By, (c) = Cosigned By    Initials Name Provider Type    MAURICIO Fox [place]                 Exercises       17 1000          Subjective Comments    Subjective Comments (P)  Reports cont LBP R >L also cont pain with neck and shoulder blades.   -MAURICIO      Subjective Pain    Able to rate subjective pain? (P)  yes  -MAURICIO       Pre-Treatment Pain Level (P)  7  -MAURICIO      Post-Treatment Pain Level (P)  7  -MAURICIO      Exercise 1    Exercise Name 1 (P)  supine piriformis stretch  -MAURIICO      Sets 1 (P)  3  -MAURICIO      Time (Seconds) 1 (P)  30  -MAURICIO      Exercise 2    Exercise Name 2 (P)  SKC  -MAURICIO      Sets 2 (P)  3  -MAURICIO      Time (Seconds) 2 (P)  30  -MAURICIO      Exercise 3    Exercise Name 3 (P)  LTR  -MAURICIO      Sets 3 (P)  3  -MAURICIO      Time (Seconds) 3 (P)  30  -MAURICIO      Exercise 4    Exercise Name 4 (P)  Trans Ab with ADD  -MAURICIO      Sets 4 (P)  2  -MAURICIO      Reps 4 (P)  10  -MAURICIO      Exercise 5    Exercise Name 5 (P)  UT stretch  -MAURICIO      Sets 5 (P)  3  -MAURICIO      Time (Seconds) 5 (P)  30  -MAURICIO      Exercise 6    Exercise Name 6 (P)  LS stretch  -MAURICIO      Sets 6 (P)  3  -MAURICIO      Time (Seconds) 6 (P)  30  -MAURICIO      Exercise 7    Exercise Name 7 (P)  Door pec stretch  -MAURICIO      Sets 7 (P)  3  -MAURICIO      Time (Seconds) 7 (P)  30  -MAURICIO      Exercise 8    Exercise Name 8 (P)  Standing ham stretch  -MAURICIO      Sets 8 (P)  3  -MAUIRCIO      Time (Seconds) 8 (P)  30  -MAURICIO        User Key  (r) = Recorded By, (t) = Taken By, (c) = Cosigned By    Initials Name Provider Type    MAURICIO Fox, ATC                                PT OP Goals       08/23/17 1000       PT Short Term Goals    STG Date to Achieve (P)  09/05/17  -MAURICIO     STG 1 (P)  Note a >/= 25% subjective improvement.  -MAURICIO     STG 1 Progress (P)  Not Met  -MAURICIO     STG 2 (P)  Modified Oswestry Low Back Disability Questionnaire score to be </= 40%  -MAURICIO     STG 2 Progress (P)  Not Met  -MAURICIO     STG 3 (P)  Complete >/= 30 minutes of aquatic therex with pain increase no greater than 2 compared to start of treatment on a 0-10 scale.  -MAURICIO     STG 3 Progress (P)  Not Met  -MAURICIO     Long Term Goals    LTG Date to Achieve (P)  09/26/17  -MAURICIO     LTG 1 (P)  Independent with HEP  -MAURICIO     LTG 1 Progress (P)  Not Met  -MAURICIO     LTG 2 (P)  Modified Oswestry Low Back Disability Questionnaire score to be </= 34%  -MAURICIO     LTG 3 (P)  B hip  strength 5/5 in all planes.  -MAURICIO     LTG 4 (P)  Minimal increase in pain ascending and descending 5 stairs.  -MAURICIO       User Key  (r) = Recorded By, (t) = Taken By, (c) = Cosigned By    Initials Name Provider Type    MAURICIO Fox ATC                 Therapy Education       08/23/17 1032          Therapy Education    Given (P)  HEP  -MAURICIO      Program (P)  New  -MAURICIO      How Provided (P)  Verbal;Written  -MAURICIO      Provided to (P)  Patient  -MAURICIO      Level of Understanding (P)  Teach back education performed;Verbalized;Demonstrated  -MAURICIO        User Key  (r) = Recorded By, (t) = Taken By, (c) = Cosigned By    Initials Name Provider Type    MAURICIO Fox ATC                 Time Calculation:   Start Time: (P) 1028  Stop Time: (P) 1122  Time Calculation (min): (P) 54 min  Total Timed Code Minutes- PT: (P) 39 minute(s)    Therapy Charges for Today     Code Description Service Date Service Provider Modifiers Qty    00033158335 HC PT THER SUPP EA 15 MIN 8/23/2017 Raj Fox ATC  1    75229091843 HC PT THER PROC EA 15 MIN 8/23/2017 Raj Fox ATC  3                    Raj Fox ATC  8/23/2017

## 2017-08-25 ENCOUNTER — APPOINTMENT (OUTPATIENT)
Dept: PHYSICAL THERAPY | Facility: HOSPITAL | Age: 57
End: 2017-08-25

## 2017-08-25 ENCOUNTER — HOSPITAL ENCOUNTER (OUTPATIENT)
Dept: PHYSICAL THERAPY | Facility: HOSPITAL | Age: 57
Setting detail: THERAPIES SERIES
Discharge: HOME | End: 2017-08-25

## 2017-08-25 DIAGNOSIS — G89.29 CHRONIC LOW BACK PAIN, UNSPECIFIED BACK PAIN LATERALITY, WITH SCIATICA PRESENCE UNSPECIFIED: ICD-10-CM

## 2017-08-25 DIAGNOSIS — M47.817 LUMBOSACRAL SPONDYLOSIS WITHOUT MYELOPATHY: Primary | ICD-10-CM

## 2017-08-25 DIAGNOSIS — M54.2 CERVICALGIA: ICD-10-CM

## 2017-08-25 DIAGNOSIS — M54.5 CHRONIC LOW BACK PAIN, UNSPECIFIED BACK PAIN LATERALITY, WITH SCIATICA PRESENCE UNSPECIFIED: ICD-10-CM

## 2017-08-25 PROCEDURE — 97110 THERAPEUTIC EXERCISES: CPT

## 2017-08-25 NOTE — THERAPY TREATMENT NOTE
Outpatient Physical Therapy Ortho Treatment Note  [facility]   Lucero Falcon       Patient Name: Ana Luisa Dos Santos  : 1960  MRN: [id number]  Today's Date: 2017      Pt reports 6/10 pain.  Attended 3/3 visits.  Insurance available: 6 approved (3 remaining)   Next MD appt: 10/31/2017.  Recertification: 2017.    Visit Date: 2017    Visit Dx:    ICD-10-CM ICD-9-CM   1. Lumbosacral spondylosis without myelopathy M47.817 721.3   2. Chronic low back pain, unspecified back pain laterality, with sciatica presence unspecified M54.5 724.2    G89.29 338.29   3. Cervicalgia M54.2 723.1       Patient Active Problem List   Diagnosis   • Chest pain        Past Medical History:   Diagnosis Date   • Anxiety    • Arthritis    • Asthma    • Asthma    • Chest pain    • High blood pressure    • Hypertension    • Migraine         Past Surgical History:   Procedure Laterality Date   • NECK SURGERY      fusion C5-6   • TOE SURGERY     • TUBAL ABDOMINAL LIGATION                               PT Assessment/Plan       17 1100       PT Assessment    Assessment Comments (P)  Pt tolerated aquatic therex well and quickly overcame her fears of water  -MB     PT Plan    PT Plan Comments (P)  Continue w/ POC. Aquatics only for next 2 visits.  -MB       User Key  (r) = Recorded By, (t) = Taken By, (c) = Cosigned By    Initials Name Provider Type    MB Lucero Falcon                     Exercises       17 1000          Subjective Comments    Subjective Comments (P)  Pt reports feeling very anxious about aquatic therapy  -MB      Subjective Pain    Able to rate subjective pain? (P)  yes  -MB      Pre-Treatment Pain Level (P)  6  -MB      Post-Treatment Pain Level (P)  6  -MB      Aquatics    Aquatics performed? (P)  Yes  -MB      Exercise 1    Exercise Name 1 (P)  aqu; f/b/s  -MB      Time (Minutes) 1 (P)  5 min ea  -MB      Exercise 2    Exercise Name 2 (P)  aqu; knee-to-chest walk  -MB       Time (Minutes) 2 (P)  3  -MB      Exercise 3    Exercise Name 3 (P)  aqu; heels to butt stretch   -MB      Time (Minutes) 3 (P)  3  -MB      Exercise 4    Exercise Name 4 (P)  aqu; paddle shld trans abd  -MB      Sets 4 (P)  2  -MB      Reps 4 (P)  15  -MB      Exercise 5    Exercise Name 5 (P)  aqu; paddle flex/ext  -MB      Sets 5 (P)  2  -MB      Reps 5 (P)  15  -MB      Exercise 6    Exercise Name 6 (P)  aqu; paddle abd/add  -MB      Sets 6 (P)  2  -MB      Reps 6 (P)  15  -MB      Exercise 7    Exercise Name 7 (P)  aqu; cookie push/pull  -MB      Sets 7 (P)  2  -MB      Reps 7 (P)  15  -MB      Exercise 8    Exercise Name 8 (P)  aqu; cookie pushdowns  -MB      Sets 8 (P)  2  -MB      Reps 8 (P)  15  -MB      Exercise 9    Exercise Name 9 (P)  aqu; wand trunk rotations  -MB      Reps 9 (P)  30  -MB      Exercise 10    Exercise Name 10 (P)  aqu; deep end pedal float  -MB      Time (Minutes) 10 (P)  8 mins  -MB        User Key  (r) = Recorded By, (t) = Taken By, (c) = Cosigned By    Initials Name Provider Type    QUANG Falcon                                PT OP Goals       08/25/17 1000       PT Short Term Goals    STG Date to Achieve (P)  09/05/17  -MB     STG 1 (P)  Note a >/= 25% subjective improvement.  -MB     STG 1 Progress (P)  Not Met  -MB     STG 2 (P)  Modified Oswestry Low Back Disability Questionnaire score to be </= 40%  -MB     STG 2 Progress (P)  Not Met  -MB     STG 3 (P)  Complete >/= 30 minutes of aquatic therex with pain increase no greater than 2 compared to start of treatment on a 0-10 scale.  -MB     STG 3 Progress (P)  Not Met  -MB     Long Term Goals    LTG Date to Achieve (P)  09/26/17  -MB     LTG 1 (P)  Independent with HEP  -MB     LTG 1 Progress (P)  Not Met  -MB     LTG 2 (P)  Modified Oswestry Low Back Disability Questionnaire score to be </= 34%  -MB     LTG 3 (P)  B hip strength 5/5 in all planes.  -MB     LTG 4 (P)  Minimal increase in pain ascending and  descending 5 stairs.  -MB       User Key  (r) = Recorded By, (t) = Taken By, (c) = Cosigned By    Initials Name Provider Type    QUANG Falcon                 Therapy Education       08/25/17 1000          Therapy Education    Given (P)  HEP  -MB      Program (P)  New  -MB      How Provided (P)  Verbal;Written  -MB      Provided to (P)  Patient  -MB      Level of Understanding (P)  Teach back education performed;Verbalized;Demonstrated  -MB        User Key  (r) = Recorded By, (t) = Taken By, (c) = Cosigned By    Initials Name Provider Type    QUANG Falcon                 Time Calculation:   Start Time: (P) 1022  Stop Time: (P) 1116  Time Calculation (min): (P) 54 min  Total Timed Code Minutes- PT: (P) 54 minute(s)    Therapy Charges for Today     Code Description Service Date Service Provider Modifiers Qty    75963902260 HC PT THER PROC EA 15 MIN 8/25/2017 Lucero Falcon GP 4                    Lucero Falcon  8/25/2017

## 2017-08-29 ENCOUNTER — APPOINTMENT (OUTPATIENT)
Dept: PHYSICAL THERAPY | Facility: HOSPITAL | Age: 57
End: 2017-08-29

## 2017-08-31 ENCOUNTER — APPOINTMENT (OUTPATIENT)
Dept: PHYSICAL THERAPY | Facility: HOSPITAL | Age: 57
End: 2017-08-31

## 2017-09-05 ENCOUNTER — APPOINTMENT (OUTPATIENT)
Dept: PHYSICAL THERAPY | Facility: HOSPITAL | Age: 57
End: 2017-09-05

## 2017-09-12 ENCOUNTER — APPOINTMENT (OUTPATIENT)
Dept: PHYSICAL THERAPY | Facility: HOSPITAL | Age: 57
End: 2017-09-12

## 2017-09-13 ENCOUNTER — HOSPITAL ENCOUNTER (OUTPATIENT)
Dept: PHYSICAL THERAPY | Facility: HOSPITAL | Age: 57
Setting detail: THERAPIES SERIES
Discharge: HOME | End: 2017-09-13

## 2017-09-13 DIAGNOSIS — G89.29 CHRONIC LOW BACK PAIN, UNSPECIFIED BACK PAIN LATERALITY, WITH SCIATICA PRESENCE UNSPECIFIED: ICD-10-CM

## 2017-09-13 DIAGNOSIS — M54.2 CERVICALGIA: ICD-10-CM

## 2017-09-13 DIAGNOSIS — M54.5 CHRONIC LOW BACK PAIN, UNSPECIFIED BACK PAIN LATERALITY, WITH SCIATICA PRESENCE UNSPECIFIED: ICD-10-CM

## 2017-09-13 DIAGNOSIS — M47.817 LUMBOSACRAL SPONDYLOSIS WITHOUT MYELOPATHY: Primary | ICD-10-CM

## 2017-09-13 PROCEDURE — 97535 SELF CARE MNGMENT TRAINING: CPT

## 2017-09-13 PROCEDURE — 97110 THERAPEUTIC EXERCISES: CPT

## 2017-09-13 NOTE — THERAPY PROGRESS REPORT/RE-CERT
Outpatient Physical Therapy Ortho Progress Note  [facility]     Patient Name: Ana Luisa Dos Santos  : 1960  MRN: [id number]  Today's Date: 2017      Visit Date: 2017  Subjective Improvement: pt is unable to quantify  Visit Number:   Insurance approval: 6  MD Visit: 10/31/2017  Recert Date: 10/4/2017      Therapy Diagnosis: chronic neck and back pain  Visit Dx:    ICD-10-CM ICD-9-CM   1. Lumbosacral spondylosis without myelopathy M47.817 721.3   2. Chronic low back pain, unspecified back pain laterality, with sciatica presence unspecified M54.5 724.2    G89.29 338.29   3. Cervicalgia M54.2 723.1       Patient Active Problem List   Diagnosis   • Chest pain        Past Medical History:   Diagnosis Date   • Anxiety    • Arthritis    • Asthma    • Asthma    • Chest pain    • High blood pressure    • Hypertension    • Migraine         Past Surgical History:   Procedure Laterality Date   • NECK SURGERY      fusion C5-6   • TOE SURGERY     • TUBAL ABDOMINAL LIGATION               PT Ortho       17 1400    ROM (Range of Motion)    General ROM Detail Lumbar: Flexion=90 degrees, Extension=15 degrees, L Sidebending=19 degrees, R Sidebending=15 degrees, L Rotation=43 degrees, R Rotation=36 degrees. Cervical: Flexion=24 degrees, Extension=28 degrees, L Sidebending=18 degrees, R Sidebending=19 degrees, L Rotation=46 degrees, R Rotation=40 degrees.  -AK    MMT (Manual Muscle Testing)    General MMT Assessment Detail B LE Strength is symmetrical: Hip Flexion=4/5, Hip Extension=4/5, Hip Abduction=4/5, Knee Flexion and extension=5/5, Ankle DF+PF=5/5  -AK      User Key  (r) = Recorded By, (t) = Taken By, (c) = Cosigned By    Initials Name Provider Type    MISA Stevenson, PT Physical Therapist                            PT Assessment/Plan       17 1737 17 1400    PT Assessment    Assessment Comments Pt presents with largely unchanged strength in UEs and LEs and ROM in cervical and lumabr  spines. Pt admits to not being diligent and consistent with HEP, PT stressed the importance and consistency with HEP and attending PT in order to derive maximum benefit.Recommend trialing 6 more visits and D/C if pt has not made any significant improvement.  -AK Pt c/o stiffness throughout tx. Not able to tolerate streches well.   -TW    PT Plan    PT Frequency 2x/week  -AK     Predicted Duration of Therapy Intervention (days/wks) 3 weeks  -AK     Physical Therapy Interventions (Optional Details) aquatics exercise;lumbar stabilization;gross motor skills;home exercise program;joint mobilization;postural re-education;patient/family education;neuromuscular re-education;modalities;manual therapy techniques;ROM (Range of Motion);strengthening;stretching;motor coordination training  -AK       User Key  (r) = Recorded By, (t) = Taken By, (c) = Cosigned By    Initials Name Provider Type     Anirudh Pelaez PTA Physical Therapy Assistant    AK Deniz Stevenson, PT Physical Therapist                Modalities       09/13/17 1400          Subjective Pain    Post-Treatment Pain Level 6  -TW        User Key  (r) = Recorded By, (t) = Taken By, (c) = Cosigned By    Initials Name Provider Type    TW Anirudh Pelaez PTA Physical Therapy Assistant                Exercises       09/13/17 1400          Subjective Pain    Able to rate subjective pain? yes  -TW      Pre-Treatment Pain Level 6  -TW      Post-Treatment Pain Level 6  -TW      Exercise 1    Exercise Name 1 supine piriformis stretch  -TW      Sets 1 3  -TW      Time (Seconds) 1 30  -TW      Exercise 2    Exercise Name 2 SKC  -TW      Sets 2 3  -TW      Time (Seconds) 2 30  -TW      Exercise 3    Exercise Name 3 LTR  -TW      Sets 3 3  -TW      Time (Seconds) 3 30  -TW      Exercise 4    Exercise Name 4 Trans Ab with ADD  -TW      Sets 4 2  -TW      Reps 4 10  -TW      Exercise 5    Exercise Name 5 UT stretch  -TW      Sets 5 3  -TW      Time (Seconds) 5 30  -TW       Exercise 6    Exercise Name 6 LS stretch  -TW      Sets 6 3  -TW      Time (Seconds) 6 30  -TW      Exercise 7    Exercise Name 7 Door pec stretch  -TW      Sets 7 3  -TW      Time (Seconds) 7 30  -TW      Exercise 8    Exercise Name 8 Standing ham stretch  -TW      Sets 8 3  -TW      Time (Seconds) 8 30  -TW      Exercise 9    Exercise Name 9 PT took objective measurements of strength and ROM  -AK        User Key  (r) = Recorded By, (t) = Taken By, (c) = Cosigned By    Initials Name Provider Type    ALEX Pelaez, PTA Physical Therapy Assistant    MISA Stevenson, PT Physical Therapist                               PT OP Goals       09/13/17 1400       PT Short Term Goals    STG Date to Achieve 09/05/17  -AK     STG 1 Note a >/= 25% subjective improvement.  -AK     STG 1 Progress Not Met  -AK     STG 2 Modified Oswestry Low Back Disability Questionnaire score to be </= 40%  -AK     STG 2 Progress Not Met  -AK     STG 3 Complete >/= 30 minutes of aquatic therex with pain increase no greater than 2 compared to start of treatment on a 0-10 scale.  -AK     STG 3 Progress Not Met  -AK     Long Term Goals    LTG Date to Achieve 09/26/17  -AK     LTG 1 Independent with HEP  -AK     LTG 1 Progress Not Met  -AK     LTG 2 Modified Oswestry Low Back Disability Questionnaire score to be </= 34%  -AK     LTG 3 B hip strength 5/5 in all planes.  -AK     LTG 4 Minimal increase in pain ascending and descending 5 stairs.  -AK       User Key  (r) = Recorded By, (t) = Taken By, (c) = Cosigned By    Initials Name Provider Type    MISA Stevenson, PT Physical Therapist                Therapy Education       09/13/17 1400          Therapy Education    Given HEP;Pain management  -TW      Program Reinforced  -TW      How Provided Verbal  -TW      Provided to Patient  -TW      Level of Understanding Verbalized;Demonstrated  -TW        User Key  (r) = Recorded By, (t) = Taken By, (c) = Cosigned By    Initials Name Provider Type     ALEX Pelaez, PTA Physical Therapy Assistant                Outcome Measures       09/13/17 1400          Modified Oswestry    Modified Oswestry Score/Comments 27/50=54%  -AK      Functional Assessment    Outcome Measure Options Cristy Teixeira  -AK        User Key  (r) = Recorded By, (t) = Taken By, (c) = Cosigned By    Initials Name Provider Type    MISA Stevenson, PT Physical Therapist            Time Calculation:   Start Time: 1357  Stop Time: 1422  Time Calculation (min): 25 min  Total Timed Code Minutes- PT: 25 minute(s)    Therapy Charges for Today     Code Description Service Date Service Provider Modifiers Qty    11911369689 HC PT SELF CARE/MGMT/TRAIN EA 15 MIN 9/13/2017 Deniz Stevenson, PT GP 1          PT G-Codes  Outcome Measure Options: Cristy Stevenson, PT  9/13/2017

## 2017-09-13 NOTE — THERAPY TREATMENT NOTE
Outpatient Physical Therapy Ortho Treatment Note  [facility]     Patient Name: Ana Luisa Dos Santos  : 1960  MRN: [id number]  Today's Date: 2017      Visit Date: 2017    Visit Dx:    ICD-10-CM ICD-9-CM   1. Lumbosacral spondylosis without myelopathy M47.817 721.3   2. Chronic low back pain, unspecified back pain laterality, with sciatica presence unspecified M54.5 724.2    G89.29 338.29   3. Cervicalgia M54.2 723.1       Patient Active Problem List   Diagnosis   • Chest pain        Past Medical History:   Diagnosis Date   • Anxiety    • Arthritis    • Asthma    • Asthma    • Chest pain    • High blood pressure    • Hypertension    • Migraine         Past Surgical History:   Procedure Laterality Date   • NECK SURGERY      fusion C5-6   • TOE SURGERY     • TUBAL ABDOMINAL LIGATION                               PT Assessment/Plan       17 1400       PT Assessment    Assessment Comments Pt c/o stiffness throughout tx. Not able to tolerate streches well.   -TW       User Key  (r) = Recorded By, (t) = Taken By, (c) = Cosigned By    Initials Name Provider Type    ALEX Pelaez PTA Physical Therapy Assistant                Modalities       17 1400          Subjective Pain    Post-Treatment Pain Level 6  -TW        User Key  (r) = Recorded By, (t) = Taken By, (c) = Cosigned By    Initials Name Provider Type    ALEX Pelaez PTA Physical Therapy Assistant                Exercises       17 1400          Subjective Pain    Able to rate subjective pain? yes  -TW      Pre-Treatment Pain Level 6  -TW      Post-Treatment Pain Level 6  -TW      Exercise 1    Exercise Name 1 supine piriformis stretch  -TW      Sets 1 3  -TW      Time (Seconds) 1 30  -TW      Exercise 2    Exercise Name 2 SKC  -TW      Sets 2 3  -TW      Time (Seconds) 2 30  -TW      Exercise 3    Exercise Name 3 LTR  -TW      Sets 3 3  -TW      Time (Seconds) 3 30  -TW      Exercise 4    Exercise Name 4 Trans  Ab with ADD  -TW      Sets 4 2  -TW      Reps 4 10  -TW      Exercise 5    Exercise Name 5 UT stretch  -TW      Sets 5 3  -TW      Time (Seconds) 5 30  -TW      Exercise 6    Exercise Name 6 LS stretch  -TW      Sets 6 3  -TW      Time (Seconds) 6 30  -TW      Exercise 7    Exercise Name 7 Door pec stretch  -TW      Sets 7 3  -TW      Time (Seconds) 7 30  -TW      Exercise 8    Exercise Name 8 Standing ham stretch  -TW      Sets 8 3  -TW      Time (Seconds) 8 30  -TW        User Key  (r) = Recorded By, (t) = Taken By, (c) = Cosigned By    Initials Name Provider Type    TW Anirudh Pelaez PTA Physical Therapy Assistant                                   Therapy Education       09/13/17 1400          Therapy Education    Given HEP;Pain management  -TW      Program Reinforced  -TW      How Provided Verbal  -TW      Provided to Patient  -TW      Level of Understanding Verbalized;Demonstrated  -TW        User Key  (r) = Recorded By, (t) = Taken By, (c) = Cosigned By    Initials Name Provider Type    TW Anirudh Peleaz PTA Physical Therapy Assistant                Time Calculation:   Start Time: 1357  Stop Time: 1422  Time Calculation (min): 25 min  Total Timed Code Minutes- PT: 25 minute(s)    Therapy Charges for Today     Code Description Service Date Service Provider Modifiers Qty    95860145917 HC PT THER SUPP EA 15 MIN 9/13/2017 Anirudh Pelaez PTA GP 1    47969059019 HC PT THER PROC EA 15 MIN 9/13/2017 Anirudh Pelaez PTA GP 2                    Anirudh Pelaez PTA  9/13/2017

## 2017-09-14 ENCOUNTER — APPOINTMENT (OUTPATIENT)
Dept: PHYSICAL THERAPY | Facility: HOSPITAL | Age: 57
End: 2017-09-14

## 2017-09-19 ENCOUNTER — APPOINTMENT (OUTPATIENT)
Dept: PHYSICAL THERAPY | Facility: HOSPITAL | Age: 57
End: 2017-09-19

## 2017-09-21 ENCOUNTER — APPOINTMENT (OUTPATIENT)
Dept: PHYSICAL THERAPY | Facility: HOSPITAL | Age: 57
End: 2017-09-21

## 2017-10-03 ENCOUNTER — OFFICE VISIT (OUTPATIENT)
Dept: PAIN MEDICINE | Facility: CLINIC | Age: 57
End: 2017-10-03

## 2017-10-03 VITALS
WEIGHT: 168.1 LBS | DIASTOLIC BLOOD PRESSURE: 82 MMHG | HEIGHT: 65 IN | SYSTOLIC BLOOD PRESSURE: 122 MMHG | BODY MASS INDEX: 28.01 KG/M2

## 2017-10-03 DIAGNOSIS — M50.30 DDD (DEGENERATIVE DISC DISEASE), CERVICAL: ICD-10-CM

## 2017-10-03 DIAGNOSIS — G89.29 CHRONIC BILATERAL LOW BACK PAIN WITHOUT SCIATICA: ICD-10-CM

## 2017-10-03 DIAGNOSIS — M79.18 MYOFASCIAL PAIN: Primary | ICD-10-CM

## 2017-10-03 DIAGNOSIS — M54.50 CHRONIC BILATERAL LOW BACK PAIN WITHOUT SCIATICA: ICD-10-CM

## 2017-10-03 PROCEDURE — 99214 OFFICE O/P EST MOD 30 MIN: CPT | Performed by: PAIN MEDICINE

## 2017-10-03 RX ORDER — TIZANIDINE 4 MG/1
4 TABLET ORAL 2 TIMES DAILY PRN
Qty: 60 TABLET | Refills: 3 | Status: SHIPPED | OUTPATIENT
Start: 2017-10-03 | End: 2017-11-02

## 2017-10-03 RX ORDER — MELOXICAM 15 MG/1
15 TABLET ORAL DAILY
Qty: 30 TABLET | Refills: 3 | Status: SHIPPED | OUTPATIENT
Start: 2017-10-03 | End: 2017-11-02

## 2017-10-03 NOTE — PROGRESS NOTES
Ana Luisa Dos Santos is a 57 y.o. female.   1960    HPI:   Location: neck, lower back, bilateral hand and bilateral leg  Quality: burning and aching  Severity: 6/10  Timing: constant  Alleviating: lying down and heating pad  Aggravating: increased activity and weather     Going to PT.  Has asked to continue because she feels as though it has improved her symptoms.  Taking meloxicam and zanaflex.  L-spine plain films read as having mild degen changes.  C-spine plain films impression below.      c-spine plain films in interim.  IMPRESSION:  CONCLUSION:  Surgical fusion of the C5 and C6 vertebral bodies with internal  fixation plate and screw device in place.  Degenerative disc disease C4-5 and C6-7.  Minimal to moderate degenerative change C3-4.  Vertebral alignment maintained with flexion and extension    The following portions of the patient's history were reviewed by me and updated as appropriate: allergies, current medications, past family history, past medical history, past social history, past surgical history and problem list.    Past Medical History:   Diagnosis Date   • Anxiety    • Arthritis    • Asthma    • Asthma    • Chest pain    • High blood pressure    • Hypertension    • Migraine        Social History     Social History   • Marital status:      Spouse name: N/A   • Number of children: N/A   • Years of education: N/A     Occupational History   • Not on file.     Social History Main Topics   • Smoking status: Heavy Tobacco Smoker     Packs/day: 0.50   • Smokeless tobacco: Never Used      Comment: 3/4 a day   • Alcohol use No   • Drug use: No      Comment: occassional    • Sexual activity: Defer     Other Topics Concern   • Not on file     Social History Narrative       Family History   Problem Relation Age of Onset   • Cancer Mother    • Cancer Father    • Heart disease Sister    • Aneurysm Paternal Grandmother          Current Outpatient Prescriptions:   •  aspirin 81 MG EC tablet, Take 81 mg  by mouth Daily., Disp: , Rfl:   •  ibuprofen (ADVIL,MOTRIN) 600 MG tablet, Take 1 tablet by mouth Every 8 (Eight) Hours As Needed for Mild Pain (1-3)., Disp: 15 tablet, Rfl: 0  •  lisinopril (PRINIVIL,ZESTRIL) 5 MG tablet, Take 5 mg by mouth Daily., Disp: , Rfl:   •  meloxicam (MOBIC) 15 MG tablet, Take 0.5 tablets by mouth Daily., Disp: 30 tablet, Rfl: 1  •  meloxicam (MOBIC) 15 MG tablet, Take 1 tablet by mouth Daily for 30 days., Disp: 30 tablet, Rfl: 3  •  tiZANidine (ZANAFLEX) 4 MG tablet, Take 1 tablet by mouth 2 (Two) Times a Day As Needed for Muscle Spasms for up to 30 days., Disp: 60 tablet, Rfl: 3    Allergies   Allergen Reactions   • Acetaminophen Hives       Review of Systems   Musculoskeletal: Positive for back pain (lower) and neck pain.        Bilateral leg pain bilateral hand pain     All other systems reviewed and are negative.    All systems reviewed and negative except for above.    Physical Exam   Constitutional: She appears well-developed and well-nourished. No distress.   Musculoskeletal:        Cervical back: She exhibits decreased range of motion (full arom with discomfort. ) and tenderness (ttp right trap and cpsm).        Lumbar back: She exhibits decreased range of motion (80 deg flexion and 5 deg ext with discomfort. ) and tenderness.   Neurological: She is alert.   Psychiatric: She has a normal mood and affect. Her behavior is normal. Judgment normal.       Ana Luisa was seen today for neck pain, back pain, hand pain and leg pain.    Diagnoses and all orders for this visit:    Myofascial pain    DDD (degenerative disc disease), cervical    Chronic bilateral low back pain without sciatica    Other orders  -     tiZANidine (ZANAFLEX) 4 MG tablet; Take 1 tablet by mouth 2 (Two) Times a Day As Needed for Muscle Spasms for up to 30 days.  -     meloxicam (MOBIC) 15 MG tablet; Take 1 tablet by mouth Daily for 30 days.        Medication: Continue mobic and zanaflex.  No opioids due to uds results  below as well as from previous pain clinic.     Interventional: none at this time.  May be a candidate for tpi's.  F/u after PT.     Rehab: Continue PT. May have issues getting klarissa's notes because he has moved.     Behavioral:  [facility] Report #[id number]  was reviewed and is consistent with stated history    Urine drug screen Reviewed from last visit and is inappropriate.  THC present.  Meth and cocaine present at previous pain clinic. On Care Everywhere in Epic.           This document has been electronically signed by Haroon Josue MD on October 3, 2017 10:06 AM

## 2017-10-04 ENCOUNTER — HOSPITAL ENCOUNTER (OUTPATIENT)
Dept: PHYSICAL THERAPY | Facility: HOSPITAL | Age: 57
Setting detail: THERAPIES SERIES
Discharge: HOME | End: 2017-10-04

## 2017-10-04 DIAGNOSIS — M47.817 LUMBOSACRAL SPONDYLOSIS WITHOUT MYELOPATHY: Primary | ICD-10-CM

## 2017-10-04 PROCEDURE — 97110 THERAPEUTIC EXERCISES: CPT

## 2017-10-04 NOTE — THERAPY PROGRESS REPORT/RE-CERT
Outpatient Physical Therapy Ortho Progress Note  [facility]     Patient Name: Ana Luisa Dos Santos  : 1960  MRN: [id number]  Today's Date: 10/4/2017      Visit Date: 10/04/2017  Subjective Improvement: 0%  Visit Number: 5  Insurance approval: 6  MD Visit: 10/31/2017  Recert Date: N/A      Therapy Diagnosis:  chronic neck and back pain  Visit Dx:    ICD-10-CM ICD-9-CM   1. Lumbosacral spondylosis without myelopathy M47.817 721.3       Patient Active Problem List   Diagnosis   • Chest pain        Past Medical History:   Diagnosis Date   • Anxiety    • Arthritis    • Asthma    • Asthma    • Chest pain    • High blood pressure    • Hypertension    • Migraine         Past Surgical History:   Procedure Laterality Date   • NECK SURGERY      fusion C5-6   • TOE SURGERY     • TUBAL ABDOMINAL LIGATION               PT Ortho       10/04/17 1300    Precautions and Contraindications    Precautions/Limitations no known precautions/limitations  -AK    Subjective Pain    Able to rate subjective pain? yes  -AK    Pre-Treatment Pain Level 8  -AK    Post-Treatment Pain Level 8  -AK    Posture/Observations    Posture/Observations Comments Pt ambulates with non-antalgic gait pattern. Pt positive for 5/5 Waddel's Signs.  -AK    ROM (Range of Motion)    General ROM Detail Lumbar: Flexion=91 degrees, Extension=30 degrees, L Sidebending=30 degrees, R Sidebending=25 degrees, R Sidebending=24 degrees, L Rotation=66 degrees, R Rotation=40 degrees.  -AK    MMT (Manual Muscle Testing)    General MMT Assessment Detail B LE Strength is symmetrical: Hip Flexion=4/5, Hip Extension=4/5, Hip Abduction=4+/5, Knee Flexion+Extension=4+/5, Ankle DF+PF=4+/5.   -AK      User Key  (r) = Recorded By, (t) = Taken By, (c) = Cosigned By    Initials Name Provider Type    MISA Stevenson, PT Physical Therapist                            PT Assessment/Plan       10/04/17 1786       PT Assessment    Assessment Comments Pt has not met any goals, has not  regularly attended therapy, presents with lumbar ROM and B LE strength WFL and was postive for 5 of 5 Waddel's Signs for non-orgamic causes of pain during re-evaluation today: for these reasons as well as the pt's lack of desire to particvipate in further PT treatment pt is D/C'd from therapy and is referred back to Dr Vang for alternative forms of treatment.  -AK       User Key  (r) = Recorded By, (t) = Taken By, (c) = Cosigned By    Initials Name Provider Type    MISA Stevenson, PT Physical Therapist                    Exercises       10/04/17 1300          Subjective Pain    Able to rate subjective pain? yes  -AK      Pre-Treatment Pain Level 8  -AK      Post-Treatment Pain Level 8  -AK      Aquatics    Aquatics performed? No  -AK      Exercise 1    Exercise Name 1 Bridges 4x10  -AK      Exercise 2    Exercise Name 2 Supine B knee to chest 2x15 each  -AK      Exercise 3    Exercise Name 3 Clamshells 2x15 each  -AK      Exercise 4    Exercise Name 4 sit to stands 3x5  -AK      Exercise 5    Exercise Name 5 PT took objective measures of LE strength and Lumbar ROM and assessed Waddel's signs.  -AK        User Key  (r) = Recorded By, (t) = Taken By, (c) = Cosigned By    Initials Name Provider Type    MISA Stevenson, PT Physical Therapist                               PT OP Goals       10/04/17 1300       PT Short Term Goals    STG Date to Achieve 09/05/17  -AK     STG 1 Note a >/= 25% subjective improvement.  -AK     STG 1 Progress Not Met  -AK     STG 2 Modified Oswestry Low Back Disability Questionnaire score to be </= 40%  -AK     STG 2 Progress Not Met  -AK     STG 3 Complete >/= 30 minutes of aquatic therex with pain increase no greater than 2 compared to start of treatment on a 0-10 scale.  -AK     STG 3 Progress Not Met  -AK     Long Term Goals    LTG Date to Achieve 09/26/17  -AK     LTG 1 Independent with HEP  -AK     LTG 1 Progress Not Met  -AK     LTG 2 Modified Oswestry Low Back Disability  Questionnaire score to be </= 34%  -AK     LTG 2 Progress Not Met  -AK     LTG 3 B hip strength 5/5 in all planes.  -AK     LTG 3 Progress Not Met  -AK     LTG 4 Minimal increase in pain ascending and descending 5 stairs.  -AK     LTG 4 Progress Not Met  -AK       User Key  (r) = Recorded By, (t) = Taken By, (c) = Cosigned By    Initials Name Provider Type    MISA Stevenson PT Physical Therapist                    Outcome Measures       10/04/17 1700          Modified Oswestry    Modified Oswestry Score/Comments 26/50=52%  -AK      Functional Assessment    Outcome Measure Options Modifed Owestry  -AK        User Key  (r) = Recorded By, (t) = Taken By, (c) = Cosigned By    Initials Name Provider Type    MISA Stevenson PT Physical Therapist            Time Calculation:   Start Time: 1306  Stop Time: 1340  Time Calculation (min): 34 min  Total Timed Code Minutes- PT: 34 minute(s)    Therapy Charges for Today     Code Description Service Date Service Provider Modifiers Qty    27160464027  PT THER PROC EA 15 MIN 10/4/2017 Deniz Stevenson, PT GP 2          PT G-Codes  Outcome Measure Options: Modifed Owestry         Deniz Stevenson, PT  10/4/2017

## 2017-11-06 ENCOUNTER — OFFICE VISIT (OUTPATIENT)
Dept: PAIN MEDICINE | Facility: CLINIC | Age: 57
End: 2017-11-06

## 2017-11-06 VITALS
BODY MASS INDEX: 28.09 KG/M2 | HEIGHT: 65 IN | WEIGHT: 168.6 LBS | SYSTOLIC BLOOD PRESSURE: 120 MMHG | DIASTOLIC BLOOD PRESSURE: 70 MMHG

## 2017-11-06 DIAGNOSIS — M50.30 DDD (DEGENERATIVE DISC DISEASE), CERVICAL: Primary | ICD-10-CM

## 2017-11-06 DIAGNOSIS — M47.817 LUMBOSACRAL SPONDYLOSIS WITHOUT MYELOPATHY: ICD-10-CM

## 2017-11-06 DIAGNOSIS — M79.18 MYOFACIAL MUSCLE PAIN: ICD-10-CM

## 2017-11-06 DIAGNOSIS — M54.2 CERVICALGIA: ICD-10-CM

## 2017-11-06 PROCEDURE — 99214 OFFICE O/P EST MOD 30 MIN: CPT | Performed by: PAIN MEDICINE

## 2017-11-06 RX ORDER — TIZANIDINE 4 MG/1
4 TABLET ORAL 2 TIMES DAILY PRN
Qty: 60 TABLET | Refills: 1 | Status: SHIPPED | OUTPATIENT
Start: 2017-11-06 | End: 2017-12-06

## 2017-11-06 NOTE — PROGRESS NOTES
"Ana Luisa Dos Santos is a 57 y.o. female.   1960    HPI:   Location: neck, lower back, bilateral hand and bilateral leg  Quality: burning and aching  Severity: 6/10  Timing: constant  Alleviating: lying down and heating pad  Aggravating: increased activity and weather    Pt has completed PT - I have reviewed notes from previous visit. Pt continues taking mobic and zanaflex. Pt reports not taking mobic anymore, but may need refill on zanaflex. I have discussed non-opiate interventions- reviewed where UDS meth and cocaine was in previous pain clinic. Pt reports \"would like to see ORTHO for my neck pain- hx cervical fusion\".       The following portions of the patient's history were reviewed by me and updated as appropriate: allergies, current medications, past family history, past medical history, past social history, past surgical history and problem list.    Past Medical History:   Diagnosis Date   • Anxiety    • Arthritis    • Asthma    • Asthma    • Chest pain    • High blood pressure    • Hypertension    • Migraine        Social History     Social History   • Marital status:      Spouse name: N/A   • Number of children: N/A   • Years of education: N/A     Occupational History   • Not on file.     Social History Main Topics   • Smoking status: Heavy Tobacco Smoker     Packs/day: 0.50   • Smokeless tobacco: Never Used      Comment: 3/4 a day   • Alcohol use No   • Drug use: Yes     Special: Marijuana      Comment: occassional    • Sexual activity: Defer     Other Topics Concern   • Not on file     Social History Narrative       Family History   Problem Relation Age of Onset   • Cancer Mother    • Cancer Father    • Heart disease Sister    • Aneurysm Paternal Grandmother          Current Outpatient Prescriptions:   •  aspirin 81 MG EC tablet, Take 81 mg by mouth Daily., Disp: , Rfl:   •  ibuprofen (ADVIL,MOTRIN) 600 MG tablet, Take 1 tablet by mouth Every 8 (Eight) Hours As Needed for Mild Pain (1-3)., Disp: " 15 tablet, Rfl: 0  •  lisinopril (PRINIVIL,ZESTRIL) 5 MG tablet, Take 5 mg by mouth Daily., Disp: , Rfl:   •  meloxicam (MOBIC) 15 MG tablet, Take 0.5 tablets by mouth Daily., Disp: 30 tablet, Rfl: 1  •  tiZANidine (ZANAFLEX) 4 MG tablet, Take 1 tablet by mouth 2 (Two) Times a Day As Needed for Muscle Spasms for up to 30 days., Disp: 60 tablet, Rfl: 1    Allergies   Allergen Reactions   • Acetaminophen Hives       Review of Systems   Musculoskeletal: Positive for back pain and neck pain.        B.hands and b. Foot pain     All other systems reviewed and are negative.    All systems reviewed and negative except for above.    Physical Exam   Constitutional: She is oriented to person, place, and time. She appears well-developed and well-nourished. No distress.   HENT:   Head: Normocephalic.   Eyes: Pupils are equal, round, and reactive to light.   Cardiovascular: Normal rate and regular rhythm.    Pulmonary/Chest: Effort normal. No respiratory distress.   Abdominal: Soft.   Musculoskeletal:        Cervical back: She exhibits decreased range of motion (slight limited fex and ext with mild facet cervcial loading) and tenderness (muscle ttp).        Lumbar back: She exhibits decreased range of motion (flex < 60 deg and 10- deg ext with elliot mild facet loading) and tenderness (mild midline ttp).   Neurological: She is alert and oriented to person, place, and time. She displays no tremor. She displays no seizure activity. Coordination and gait normal.   Reflex Scores:       Tricep reflexes are 2+ on the right side and 2+ on the left side.       Bicep reflexes are 2+ on the right side and 2+ on the left side.       Brachioradialis reflexes are 2+ on the right side and 2+ on the left side.       Patellar reflexes are 2+ on the right side and 2+ on the left side.       Achilles reflexes are 1+ on the right side and 1+ on the left side.      Upper arm and Lower leg strength 5/5       Skin: Skin is warm and dry.   Psychiatric:  She has a normal mood and affect. Her behavior is normal. Judgment normal. She expresses no homicidal and no suicidal ideation. She expresses no suicidal plans and no homicidal plans.   Nursing note and vitals reviewed.      Ana Luisa was seen today for back pain, neck pain, shoulder pain and pain.    Diagnoses and all orders for this visit:    DDD (degenerative disc disease), cervical  -     Ambulatory Referral to Orthopedic Surgery    Myofacial muscle pain  -     tiZANidine (ZANAFLEX) 4 MG tablet; Take 1 tablet by mouth 2 (Two) Times a Day As Needed for Muscle Spasms for up to 30 days.  -     Inject Trigger Points, > 3    Cervicalgia  -     tiZANidine (ZANAFLEX) 4 MG tablet; Take 1 tablet by mouth 2 (Two) Times a Day As Needed for Muscle Spasms for up to 30 days.  -     Inject Trigger Points, > 3  -     Ambulatory Referral to Orthopedic Surgery    Lumbosacral spondylosis without myelopathy        Medication: I have refilled Zanaflex as directed- no opiates will be discussed. Pt will take otc nsaids related to not taking mobic.     Interventional: I have discussed and ordered TPIs to upper and middle back.   I have discussed the risks and benefits of the procedure with the patient.  DAVY and any neurological impairments discussed with patient that may need of emergency evaluation. I have referred Pt to ortho related to cervical pain- possible radicular pain.       Rehab: Pt remains with PT exercises at home.     Behavioral: No aberrant behavior noted. [facility] Report # [id number]  was reviewed and is consistent with stated history    Urine drug screen None at this time.           This document has been electronically signed by RAFA Hawk on November 6, 2017 11:32 AM          This document has been electronically signed by RAFA Hawk on November 6, 2017 11:32 AM

## 2017-11-30 ENCOUNTER — OFFICE VISIT (OUTPATIENT)
Dept: ORTHOPEDIC SURGERY | Facility: CLINIC | Age: 57
End: 2017-11-30

## 2017-11-30 VITALS — HEIGHT: 65 IN | BODY MASS INDEX: 28.66 KG/M2 | WEIGHT: 172 LBS

## 2017-11-30 DIAGNOSIS — M54.12 CERVICAL RADICULAR PAIN: Primary | ICD-10-CM

## 2017-11-30 DIAGNOSIS — M54.2 NECK PAIN: ICD-10-CM

## 2017-11-30 PROCEDURE — 99213 OFFICE O/P EST LOW 20 MIN: CPT | Performed by: NURSE PRACTITIONER

## 2017-12-11 DIAGNOSIS — M54.2 NECK PAIN: Primary | ICD-10-CM

## 2017-12-11 DIAGNOSIS — M54.12 CERVICAL RADICULAR PAIN: ICD-10-CM

## 2017-12-12 ENCOUNTER — TELEPHONE (OUTPATIENT)
Dept: ORTHOPEDIC SURGERY | Facility: CLINIC | Age: 57
End: 2017-12-12

## 2018-04-24 ENCOUNTER — HOSPITAL ENCOUNTER (OUTPATIENT)
Facility: HOSPITAL | Age: 58
Setting detail: HOSPITAL OUTPATIENT SURGERY
End: 2018-04-24
Attending: INTERNAL MEDICINE | Admitting: INTERNAL MEDICINE

## 2018-04-24 ENCOUNTER — OFFICE VISIT (OUTPATIENT)
Dept: GASTROENTEROLOGY | Facility: CLINIC | Age: 58
End: 2018-04-24

## 2018-04-24 VITALS
HEIGHT: 65 IN | BODY MASS INDEX: 28.92 KG/M2 | DIASTOLIC BLOOD PRESSURE: 80 MMHG | HEART RATE: 65 BPM | WEIGHT: 173.6 LBS | SYSTOLIC BLOOD PRESSURE: 132 MMHG

## 2018-04-24 DIAGNOSIS — R13.10 DYSPHAGIA, UNSPECIFIED TYPE: ICD-10-CM

## 2018-04-24 DIAGNOSIS — R10.13 EPIGASTRIC PAIN: ICD-10-CM

## 2018-04-24 DIAGNOSIS — Z86.010 ENCOUNTER FOR COLONOSCOPY DUE TO HISTORY OF COLONIC POLYP: ICD-10-CM

## 2018-04-24 DIAGNOSIS — K21.9 GASTROESOPHAGEAL REFLUX DISEASE, ESOPHAGITIS PRESENCE NOT SPECIFIED: Primary | ICD-10-CM

## 2018-04-24 DIAGNOSIS — Z12.11 ENCOUNTER FOR COLONOSCOPY DUE TO HISTORY OF COLONIC POLYP: ICD-10-CM

## 2018-04-24 PROCEDURE — 99214 OFFICE O/P EST MOD 30 MIN: CPT | Performed by: NURSE PRACTITIONER

## 2018-04-24 RX ORDER — DEXTROSE AND SODIUM CHLORIDE 5; .45 G/100ML; G/100ML
30 INJECTION, SOLUTION INTRAVENOUS CONTINUOUS PRN
Status: CANCELLED | OUTPATIENT
Start: 2018-04-24

## 2018-04-24 RX ORDER — CYCLOBENZAPRINE HCL 10 MG
TABLET ORAL
COMMUNITY
Start: 2018-04-04 | End: 2020-09-21

## 2018-04-24 RX ORDER — DICLOFENAC SODIUM 75 MG/1
TABLET, DELAYED RELEASE ORAL
COMMUNITY
Start: 2018-04-04 | End: 2020-03-11

## 2018-04-24 RX ORDER — OMEPRAZOLE 40 MG/1
40 CAPSULE, DELAYED RELEASE ORAL DAILY
Qty: 30 CAPSULE | Refills: 5 | Status: SHIPPED | OUTPATIENT
Start: 2018-04-24 | End: 2020-03-11

## 2018-04-24 NOTE — PROGRESS NOTES
Chief Complaint   Patient presents with   • Colonoscopy     screening       Subjective    Ana Luisa Dos Santos is a 57 y.o. female. she is being seen for consultation today at the request of RAFA Tate   Patient presents to discuss screening colonoscopy and abdominal pain. States her last colonoscopy was doneIn [facility] greater than 5 years ago.  Review of care everywhere notes she had rectal hyperplastic polyp removed 2/19/14.  States she frequently has nausea abdominal pain.  Denies any family history of colorectal cancer.  Weight stable.  States bowel habits are more constipated denies any blood or melena within the stool.    Heartburn   She complains of abdominal pain (intermittent ), belching, dysphagia (feels like food hurts going down then leaves sore upper abdomen, ), early satiety, heartburn and nausea (intermittent ). She reports no chest pain, no choking, no coughing, no hoarse voice or no sore throat. This is a chronic problem. The current episode started more than 1 month ago. The problem occurs frequently. The heartburn is located in the substernum. The heartburn does not wake her from sleep. The heartburn does not limit her activity. The heartburn doesn't change with position. The symptoms are aggravated by certain foods and exertion. Pertinent negatives include no fatigue. Risk factors include smoking/tobacco exposure.   plan; schedule patient for EGD due to epigastric pain, reflux symptoms and nausea. We'll go ahead and start patient on Prilosec 40 mg per day for gastroesophageal reflux disease.  Schedule patient for screening colonoscopy due to history of rectal polyp.  Follow-up after test return office sooner if needed.         The following portions of the patient's history were reviewed and updated as appropriate:   Past Medical History:   Diagnosis Date   • Anxiety    • Arthritis    • Asthma    • Asthma    • Chest pain    • High blood pressure    • Hypertension    • Migraine       Past Surgical History:   Procedure Laterality Date   • COLONOSCOPY     • NECK SURGERY      fusion C5-6   • TOE SURGERY     • TUBAL ABDOMINAL LIGATION       Family History   Problem Relation Age of Onset   • Cancer Mother    • Heart disease Mother    • Cancer Father    • Heart disease Sister    • Aneurysm Paternal Grandmother    • Hypertension Other      OB History     No data available        Current Outpatient Prescriptions   Medication Sig Dispense Refill   • cyclobenzaprine (FLEXERIL) 10 MG tablet      • diclofenac (VOLTAREN) 75 MG EC tablet      • lisinopril (PRINIVIL,ZESTRIL) 5 MG tablet Take 5 mg by mouth Daily.     • omeprazole (priLOSEC) 40 MG capsule Take 1 capsule by mouth Daily. 30 capsule 5   • polyethylene glycol (GoLYTELY) 236 g solution Starting at noon on day prior to procedure, drink 8 ounces every 30 minutes until all gone or stools are clear. May add flavor packet. 4000 mL 0     No current facility-administered medications for this visit.      Allergies   Allergen Reactions   • Acetaminophen Hives     With codeine       Social History     Social History   • Marital status:      Social History Main Topics   • Smoking status: Heavy Tobacco Smoker     Packs/day: 0.50   • Smokeless tobacco: Never Used      Comment: 3/4 a day   • Alcohol use Yes   • Drug use:      Types: Marijuana      Comment: occassional    • Sexual activity: Defer     Other Topics Concern   • Not on file       Review of Systems  Review of Systems   Constitutional: Negative for activity change, appetite change, chills, diaphoresis, fatigue, fever and unexpected weight change.   HENT: Negative for hoarse voice, sore throat and trouble swallowing.    Respiratory: Negative for cough, choking and shortness of breath.    Cardiovascular: Negative for chest pain.   Gastrointestinal: Positive for abdominal pain (intermittent ), dysphagia (feels like food hurts going down then leaves sore upper abdomen, ), heartburn and nausea  "(intermittent ). Negative for abdominal distention, anal bleeding, blood in stool, constipation, diarrhea, rectal pain and vomiting.   Musculoskeletal: Negative for arthralgias.   Skin: Negative for pallor.   Neurological: Negative for light-headedness.        /80   Pulse 65   Ht 165.1 cm (65\")   Wt 78.7 kg (173 lb 9.6 oz)   BMI 28.89 kg/m²     Objective    Physical Exam   Constitutional: She is oriented to person, place, and time. She appears well-developed and well-nourished. She is cooperative. No distress.   HENT:   Head: Normocephalic and atraumatic.   Neck: Normal range of motion. Neck supple. No thyromegaly present.   Cardiovascular: Normal rate, regular rhythm and normal heart sounds.    Pulmonary/Chest: Effort normal and breath sounds normal. She has no wheezes. She has no rhonchi. She has no rales.   Abdominal: Soft. Normal appearance and bowel sounds are normal. She exhibits no distension. There is no hepatosplenomegaly. There is tenderness in the epigastric area. There is no rigidity and no guarding. No hernia.   Lymphadenopathy:     She has no cervical adenopathy.   Neurological: She is alert and oriented to person, place, and time.   Skin: Skin is warm, dry and intact. No rash noted. No pallor.   Psychiatric: She has a normal mood and affect. Her speech is normal.     Office Visit on 08/04/2017   Component Date Value Ref Range Status   • Report Summary 08/11/2017 FINAL   Final    Comment: ====================================================================  [facility] SELECT 13 (MW)  ====================================================================  Test                             Result       Flag       Units  Drug Present    Carboxy-THC                    33                      ng/mg creat     Carboxy-THC is a metabolite of tetrahydrocannabinol  (THC).     Source of THC is most commonly illicit, but THC is also present     in a scheduled prescription " medication.  ====================================================================  Test                      Result    Flag   Units      Ref Range    Creatinine              81               mg/dL      >=20  ====================================================================  Declared Medications:   Medication list was not provided.  ====================================================================  For clinical consultation, please call [phone].  ====================================================================     Assessment/Plan      1. Gastroesophageal reflux disease, esophagitis presence not specified    2. Epigastric pain    3. Dysphagia, unspecified type    4. Encounter for colonoscopy due to history of colonic polyp    .       Orders placed during this encounter include:      ESOPHAGOGASTRODUODENOSCOPY (N/A), COLONOSCOPY (N/A)    Review and/or summary of lab tests, radiology, procedures, medications. Review and summary of old records and obtaining of history. The risks and benefits of my recommendations, as well as other treatment options were discussed with the patient today. Questions were answered.    New Medications Ordered This Visit   Medications   • polyethylene glycol (GoLYTELY) 236 g solution     Sig: Starting at noon on day prior to procedure, drink 8 ounces every 30 minutes until all gone or stools are clear. May add flavor packet.     Dispense:  4000 mL     Refill:  0   • omeprazole (priLOSEC) 40 MG capsule     Sig: Take 1 capsule by mouth Daily.     Dispense:  30 capsule     Refill:  5       Follow-up: Return in about 4 weeks (around 5/22/2018) for Recheck After Procedure .          This document has been electronically signed by RAFA Brooks on April 24, 2018 1:39 PM             Results for orders placed or performed in visit on 08/04/17   [facility] Select 13 (MW)   Result Value Ref Range    Report Summary FINAL    Results for orders placed or  performed in visit on 06/21/17   Doppler Ankle Brachial Index Single Level CAR   Result Value Ref Range    RIGHT DORSALIS PEDIS SYS  mmHg    RIGHT POST TIBIAL SYS  mmHg    RIGHT TIARRA RATIO 1.07     LEFT DORSALIS PEDIS SYS  mmHg    LEFT POST TIBIAL SYS  mmHg    LEFT TIARRA RATIO 1.12     Upper arterial right arm brachial sys max 128 mmHg    Upper arterial left arm brachial sys max 129 mmHg   Stress Test With Myocardial Perfusion One Day   Result Value Ref Range     CV STRESS PROTOCOL 1 Pharmacologic     Stage 1 1     Duration Min Stage 1 0     Duration Sec Stage 1 10     Stress Dose Regadenoson Stage 1 0.4     Stress Comments Stage 1 10 sec bolus injection     Target HR (85%) 141 bpm    Max. Pred. HR (100%) 166 bpm    HR Stage 1 53     BP Stage 1 124/70     Baseline HR 53 bpm    Baseline /70 mmHg    Peak HR 96 bpm    Percent Max Pred HR 57.83 %    Percent Target HR 68 %    Peak /67 mmHg    Recovery HR 70 bpm    Recovery /73 mmHg    Estimated workload 1.0 METS    Nuc Stress EF 65 %   Adult Transthoracic Echo Complete   Result Value Ref Range    IVSd 1.2 cm    IVSs 1.7 cm    LVIDd 4.8 cm    LVIDs 2.7 cm    LVPWd 1.1 cm     CV ECHO LINDA - LVPWS 1.7 cm    IVS/LVPW 1.1     FS 43.8 %    EDV(Teich) 107.5 ml    ESV(Teich) 27.0 ml    EF(Teich) 74.9 %    EDV(cubed) 110.6 ml    ESV(cubed) 19.7 ml    EF(cubed) 82.2 %    % IVS thick 41.7 %    % LVPW thick 54.5 %    LV mass(C)d 206.4 grams    LV mass(C)s 173.1 grams    SV(Teich) 80.5 ml    SV(cubed) 90.9 ml    Ao root diam 2.3 cm    Ao root area 4.2 cm^2    ACS 1.9 cm    LA dimension 3.7 cm    LA/Ao 1.6     LVOT diam 2.3 cm    LVOT area 4.2 cm^2    LVOT area(traced) 4.2 cm^2    MV E max luisana 98.2 cm/sec    MV A max luisana 50.3 cm/sec    MV E/A 2.0     MV P1/2t max luisana 106.0 cm/sec    MV P1/2t 75.5 msec    MVA(P1/2t) 2.9 cm^2    MV dec slope 411.0 cm/sec^2    Ao pk luisana 132.0 cm/sec    Ao max PG 7.0 mmHg    Ao max PG (full) 2.3 mmHg    Ao V2  mean 88.0 cm/sec    Ao mean PG 4.0 mmHg    Ao mean PG (full) 2.0 mmHg    Ao V2 VTI 29.0 cm    GABRIEL(I,A) 3.2 cm^2    GABRIEL(I,D) 3.2 cm^2    GABRIEL(V,A) 3.4 cm^2    GABRIEL(V,D) 3.4 cm^2    LV V1 max PG 4.7 mmHg    LV V1 mean PG 2.0 mmHg    LV V1 max 108.0 cm/sec    LV V1 mean 67.2 cm/sec    LV V1 VTI 22.4 cm    MR max luisana 431.0 cm/sec    MR max PG 74.3 mmHg    SV(Ao) 120.5 ml    SV(LVOT) 93.1 ml    PA V2 max 74.5 cm/sec    PA max PG 2.2 mmHg    TR max luisana 299.0 cm/sec    RVSP(TR) 40.8 mmHg    RAP systole 5.0 mmHg    MVA P1/2T LCG 2.1 cm^2    Echo EF Estimated 50 %   Results for orders placed or performed during the hospital encounter of 04/20/17   Gold Top - SST   Result Value Ref Range    Extra Tube Hold for add-ons.    Green Top (Gel)   Result Value Ref Range    Extra Tube Hold for add-ons.    CK-MB   Result Value Ref Range    CKMB 0.47 0.00 - 5.00 ng/mL   CBC Auto Differential   Result Value Ref Range    WBC 6.28 3.20 - 9.80 10*3/mm3    RBC 4.20 3.77 - 5.16 10*6/mm3    Hemoglobin 13.4 12.0 - 15.5 g/dL    Hematocrit 39.5 35.0 - 45.0 %    MCV 94.0 80.0 - 98.0 fL    MCH 31.9 26.5 - 34.0 pg    MCHC 33.9 31.4 - 36.0 g/dL    RDW 12.4 11.5 - 14.5 %    RDW-SD 42.3 36.4 - 46.3 fl    MPV 9.7 8.0 - 12.0 fL    Platelets 383 150 - 450 10*3/mm3    Neutrophil % 46.8 37.0 - 80.0 %    Lymphocyte % 42.2 10.0 - 50.0 %    Monocyte % 5.6 0.0 - 12.0 %    Eosinophil % 4.6 0.0 - 7.0 %    Basophil % 0.6 0.0 - 2.0 %    Immature Grans % 0.2 0.0 - 0.5 %    Neutrophils, Absolute 2.94 2.00 - 8.60 10*3/mm3    Lymphocytes, Absolute 2.65 0.60 - 4.20 10*3/mm3    Monocytes, Absolute 0.35 0.00 - 0.90 10*3/mm3    Eosinophils, Absolute 0.29 0.00 - 0.70 10*3/mm3    Basophils, Absolute 0.04 0.00 - 0.20 10*3/mm3    Immature Grans, Absolute 0.01 0.00 - 0.02 10*3/mm3    nRBC 0.0 0.0 - 0.0 /100 WBC     *Note: Due to a large number of results and/or encounters for the requested time period, some results have not been displayed. A complete set of results can be found  in Results Review.

## 2018-04-24 NOTE — PATIENT INSTRUCTIONS
MyPlate from [employer]  The general, healthful diet is based on the 2010 Dietary Guidelines for Americans. The amount of food you need to eat from each food group depends on your age, sex, and level of physical activity and can be individualized by a dietitian. Go to ChooseMyPlate.gov for more information.  What do I need to know about the MyPlate plan?  · Enjoy your food, but eat less.  · Avoid oversized portions.  ¨ ½ of your plate should include fruits and vegetables.  ¨ ¼ of your plate should be grains.  ¨ ¼ of your plate should be protein.  Grains   · Make at least half of your grains whole grains.  · For a 2,000 calorie daily food plan, eat 6 oz every day.  · 1 oz is about 1 slice bread, 1 cup cereal, or ½ cup cooked rice, cereal, or pasta.  Vegetables   · Make half your plate fruits and vegetables.  · For a 2,000 calorie daily food plan, eat 2½ cups every day.  · 1 cup is about 1 cup raw or cooked vegetables or vegetable juice or 2 cups raw leafy greens.  Fruits   · Make half your plate fruits and vegetables.  · For a 2,000 calorie daily food plan, eat 2 cups every day.  · 1 cup is about 1 cup fruit or 100% fruit juice or ½ cup dried fruit.  Protein   · For a 2,000 calorie daily food plan, eat 5½ oz every day.  · 1 oz is about 1 oz meat, poultry, or fish, ¼ cup cooked beans, 1 egg, 1 Tbsp peanut butter, or ½ oz nuts or seeds.  Dairy   · Switch to fat-free or low-fat (1%) milk.  · For a 2,000 calorie daily food plan, eat 3 cups every day.  · 1 cup is about 1 cup milk or yogurt or soy milk (soy beverage), 1½ oz natural cheese, or 2 oz processed cheese.  Fats, Oils, and Empty Calories   · Only small amounts of oils are recommended.  · Empty calories are calories from solid fats or added sugars.  · Compare sodium in foods like soup, bread, and frozen meals. Choose the foods with lower numbers.  · Drink water instead of sugary drinks.  What foods can I eat?  Grains   Whole grains such as whole wheat, quinoa, millet,  and bulgur. Bread, rolls, and pasta made from whole grains. Brown or wild rice. Hot or cold cereals made from whole grains and without added sugar.  Vegetables   All fresh vegetables, especially fresh red, dark green, or orange vegetables. Peas and beans. Low-sodium frozen or canned vegetables prepared without added salt. Low-sodium vegetable juices.  Fruits   All fresh, frozen, and dried fruits. Canned fruit packed in water or fruit juice without added sugar. Fruit juices without added sugar.  Meats and Other Protein Sources   Boiled, baked, or grilled lean meat trimmed of fat. Skinless poultry. Fresh seafood and shellfish. Canned seafood packed in water. Unsalted nuts and unsalted nut butters. Tofu. Dried beans and pea. Eggs.  Dairy   Low-fat or fat-free milk, yogurt, and cheeses.  Sweets and Desserts   Frozen desserts made from low-fat milk.  Fats and Oils   Olive, peanut, and canola oils and margarine. Salad dressing and mayonnaise made from these oils.  Other   Soups and casseroles made from allowed ingredients and without added fat or salt.  The items listed above may not be a complete list of recommended foods or beverages. Contact your dietitian for more options.   What foods are not recommended?  Grains   Sweetened, low-fiber cereals. Packaged baked goods. Snack crackers and chips. Cheese crackers, butter crackers, and biscuits. Frozen waffles, sweet breads, doughnuts, pastries, packaged baking mixes, pancakes, cakes, and cookies.  Vegetables   Regular canned or frozen vegetables or vegetables prepared with salt. Canned tomatoes. Canned tomato sauce. Fried vegetables. Vegetables in cream sauce or cheese sauce.  Fruits   Fruits packed in syrup or made with added sugar.  Meats and Other Protein Sources   Marbled or fatty meats such as ribs. Poultry with skin. Fried meats, poultry, eggs, or fish. Sausages, hot dogs, and deli meats such as pastrami, bologna, or salami.  Dairy   Whole milk, cream, cheeses made  from whole milk, sour cream. Ice cream or yogurt made from whole milk or with added sugar.  Beverages   For adults, no more than one alcoholic drink per day. Regular soft drinks or other sugary beverages. Juice drinks.  Sweets and Desserts   Sugary or fatty desserts, candy, and other sweets.  Fats and Oils   Solid shortening or partially hydrogenated oils. Solid margarine. Margarine that contains trans fats. Butter.  The items listed above may not be a complete list of foods and beverages to avoid. Contact your dietitian for more information.   This information is not intended to replace advice given to you by your health care provider. Make sure you discuss any questions you have with your health care provider.  Document Released: 01/06/2009 Document Revised: 05/25/2017 Document Reviewed: 11/26/2014  [employer] Patient Education © 2017 [employer]  BMI for Adults  Body mass index (BMI) is a number that is calculated from a person's weight and height. In most adults, the number is used to find how much of an adult's weight is made up of fat. BMI is not as accurate as a direct measure of body fat.  How is BMI calculated?  BMI is calculated by dividing weight in kilograms by height in meters squared. It can also be calculated by dividing weight in pounds by height in inches squared, then multiplying the resulting number by 703. Charts are available to help you find your BMI quickly and easily without doing this calculation.  How is BMI interpreted?  Health care professionals use BMI charts to identify whether an adult is underweight, at a normal weight, or overweight based on the following guidelines:  · Underweight: BMI less than 18.5.  · Normal weight: BMI between 18.5 and 24.9.  · Overweight: BMI between 25 and 29.9.  · Obese: BMI of 30 and above.  BMI is usually interpreted the same for males and females.  Weight includes both fat and muscle, so someone with a muscular build, such as an athlete, may  have a BMI that is higher than 24.9. In cases like these, BMI may not accurately depict body fat. To determine if excess body fat is the cause of a BMI of 25 or higher, further assessments may need to be done by a health care provider.  Why is BMI a useful tool?  BMI is used to identify a possible weight problem that may be related to a medical problem or may increase the risk for medical problems. BMI can also be used to promote changes to reach a healthy weight.  This information is not intended to replace advice given to you by your health care provider. Make sure you discuss any questions you have with your health care provider.  Document Released: 08/29/2005 Document Revised: 04/27/2017 Document Reviewed: 05/15/2015  [employer] Patient Education © 2017 [employer]

## 2018-10-02 ENCOUNTER — TRANSCRIBE ORDERS (OUTPATIENT)
Dept: MRI IMAGING | Facility: HOSPITAL | Age: 58
End: 2018-10-02

## 2018-10-02 DIAGNOSIS — M54.2 CERVICALGIA: Primary | ICD-10-CM

## 2018-10-03 ENCOUNTER — HOSPITAL ENCOUNTER (OUTPATIENT)
Dept: MRI IMAGING | Facility: HOSPITAL | Age: 58
Discharge: HOME | End: 2018-10-03

## 2018-10-03 ENCOUNTER — HOSPITAL ENCOUNTER (OUTPATIENT)
Dept: MRI IMAGING | Facility: HOSPITAL | Age: 58
Discharge: HOME | End: 2018-10-03
Admitting: PAIN MEDICINE

## 2018-10-03 DIAGNOSIS — M54.2 CERVICALGIA: ICD-10-CM

## 2018-10-03 PROCEDURE — 72141 MRI NECK SPINE W/O DYE: CPT

## 2018-10-03 PROCEDURE — 72148 MRI LUMBAR SPINE W/O DYE: CPT

## 2018-12-06 ENCOUNTER — HOSPITAL ENCOUNTER (EMERGENCY)
Facility: HOSPITAL | Age: 58
Discharge: LEFT BEFORE DISCHARGE | End: 2018-12-06

## 2018-12-06 VITALS
BODY MASS INDEX: 26.52 KG/M2 | TEMPERATURE: 97.7 F | SYSTOLIC BLOOD PRESSURE: 164 MMHG | OXYGEN SATURATION: 99 % | HEIGHT: 66 IN | DIASTOLIC BLOOD PRESSURE: 69 MMHG | WEIGHT: 165 LBS | RESPIRATION RATE: 18 BRPM | HEART RATE: 62 BPM

## 2019-01-21 ENCOUNTER — HOSPITAL ENCOUNTER (OUTPATIENT)
Dept: CT IMAGING | Facility: HOSPITAL | Age: 59
Discharge: HOME | End: 2019-01-21
Admitting: NURSE PRACTITIONER

## 2019-01-21 DIAGNOSIS — G89.29 CHRONIC INTRACTABLE HEADACHE, UNSPECIFIED HEADACHE TYPE: ICD-10-CM
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
## 2019-01-21 DIAGNOSIS — R51.9 CHRONIC INTRACTABLE HEADACHE, UNSPECIFIED HEADACHE TYPE: ICD-10-CM

## 2019-01-21 PROCEDURE — 70450 CT HEAD/BRAIN W/O DYE: CPT

## 2020-03-11 ENCOUNTER — CONSULT (OUTPATIENT)
Dept: ONCOLOGY | Facility: CLINIC | Age: 60
End: 2020-03-11

## 2020-03-11 ENCOUNTER — LAB (OUTPATIENT)
Dept: ONCOLOGY | Facility: HOSPITAL | Age: 60
End: 2020-03-11

## 2020-03-11 VITALS
WEIGHT: 179.3 LBS | HEART RATE: 70 BPM | DIASTOLIC BLOOD PRESSURE: 94 MMHG | TEMPERATURE: 97.2 F | RESPIRATION RATE: 18 BRPM | SYSTOLIC BLOOD PRESSURE: 171 MMHG | BODY MASS INDEX: 29.84 KG/M2

## 2020-03-11 DIAGNOSIS — D75.839 THROMBOCYTOSIS: ICD-10-CM

## 2020-03-11 DIAGNOSIS — Z71.6 ENCOUNTER FOR TOBACCO USE CESSATION COUNSELING: ICD-10-CM

## 2020-03-11 DIAGNOSIS — D75.1 POLYCYTHEMIA: ICD-10-CM

## 2020-03-11 DIAGNOSIS — D75.839 THROMBOCYTOSIS: Primary | ICD-10-CM

## 2020-03-11 LAB
BASOPHILS # BLD AUTO: 0.06 10*3/MM3 (ref 0–0.2)
BASOPHILS NFR BLD AUTO: 0.9 % (ref 0–1.5)
DEPRECATED RDW RBC AUTO: 41.8 FL (ref 37–54)
EOSINOPHIL # BLD AUTO: 0.11 10*3/MM3 (ref 0–0.4)
EOSINOPHIL NFR BLD AUTO: 1.6 % (ref 0.3–6.2)
ERYTHROCYTE [DISTWIDTH] IN BLOOD BY AUTOMATED COUNT: 12.1 % (ref 12.3–15.4)
FERRITIN SERPL-MCNC: 300.4 NG/ML (ref 13–150)
HCT VFR BLD AUTO: 41 % (ref 34–46.6)
HGB BLD-MCNC: 13.3 G/DL (ref 12–15.9)
IMM GRANULOCYTES # BLD AUTO: 0.04 10*3/MM3 (ref 0–0.05)
IMM GRANULOCYTES NFR BLD AUTO: 0.6 % (ref 0–0.5)
IRON 24H UR-MRATE: 67 MCG/DL (ref 37–145)
IRON SATN MFR SERPL: 17 % (ref 20–50)
LYMPHOCYTES # BLD AUTO: 2.98 10*3/MM3 (ref 0.7–3.1)
LYMPHOCYTES NFR BLD AUTO: 43.8 % (ref 19.6–45.3)
MCH RBC QN AUTO: 31.1 PG (ref 26.6–33)
MCHC RBC AUTO-ENTMCNC: 32.4 G/DL (ref 31.5–35.7)
MCV RBC AUTO: 96 FL (ref 79–97)
MONOCYTES # BLD AUTO: 0.35 10*3/MM3 (ref 0.1–0.9)
MONOCYTES NFR BLD AUTO: 5.1 % (ref 5–12)
NEUTROPHILS # BLD AUTO: 3.27 10*3/MM3 (ref 1.7–7)
NEUTROPHILS NFR BLD AUTO: 48 % (ref 42.7–76)
NRBC BLD AUTO-RTO: 0 /100 WBC (ref 0–0.2)
PLATELET # BLD AUTO: 362 10*3/MM3 (ref 140–450)
PMV BLD AUTO: 9.8 FL (ref 6–12)
RBC # BLD AUTO: 4.27 10*6/MM3 (ref 3.77–5.28)
TIBC SERPL-MCNC: 389 MCG/DL (ref 298–536)
TRANSFERRIN SERPL-MCNC: 261 MG/DL (ref 200–360)
WBC NRBC COR # BLD: 6.81 10*3/MM3 (ref 3.4–10.8)

## 2020-03-11 PROCEDURE — 99406 BEHAV CHNG SMOKING 3-10 MIN: CPT | Performed by: INTERNAL MEDICINE

## 2020-03-11 PROCEDURE — 99203 OFFICE O/P NEW LOW 30 MIN: CPT | Performed by: INTERNAL MEDICINE

## 2020-03-11 PROCEDURE — G0463 HOSPITAL OUTPT CLINIC VISIT: HCPCS | Performed by: INTERNAL MEDICINE

## 2020-03-11 PROCEDURE — 82668 ASSAY OF ERYTHROPOIETIN: CPT

## 2020-03-11 PROCEDURE — 85025 COMPLETE CBC W/AUTO DIFF WBC: CPT

## 2020-03-11 PROCEDURE — 84466 ASSAY OF TRANSFERRIN: CPT

## 2020-03-11 PROCEDURE — 82728 ASSAY OF FERRITIN: CPT

## 2020-03-11 PROCEDURE — 83540 ASSAY OF IRON: CPT

## 2020-03-13 ENCOUNTER — TELEPHONE (OUTPATIENT)
Dept: ONCOLOGY | Facility: HOSPITAL | Age: 60
End: 2020-03-13

## 2020-03-13 LAB — ETHNIC BACKGROUND STATED: 12.7 MIU/ML (ref 2.6–18.5)

## 2020-03-13 NOTE — TELEPHONE ENCOUNTER
----- Message from Haley Mcrae MD sent at 3/13/2020  3:15 PM CDT -----  Let patient know that her platelet, hg and iron studies are normal.

## 2020-03-14 PROBLEM — D75.1 POLYCYTHEMIA: Status: ACTIVE | Noted: 2020-03-14

## 2020-03-14 PROBLEM — D75.839 THROMBOCYTOSIS: Status: ACTIVE | Noted: 2020-03-14

## 2020-03-14 PROBLEM — Z71.6 ENCOUNTER FOR TOBACCO USE CESSATION COUNSELING: Status: ACTIVE | Noted: 2020-03-14

## 2020-03-14 NOTE — PROGRESS NOTES
Ana Luisa Dos Santos  [id number]  1960      REASON FOR CONSULTATION:  Thrombocytosis  Provide an opinion on any further workup or treatment                             REQUESTING PHYSICIAN:  Bee Santoyo     RECORDS OBTAINED:  Records of the patients history including those obtained from the referring provider were reviewed and summarized in detail.      History of Present Illness     This is a pleasant 59-year-old female who was seen in consultation at the request of Bee Santoyo APRN  for evaluation of thrombocytosis and polycythemia.  Patient has past medical history of tobacco abuse, gastroesophageal reflux disease, migraine headaches and hypertension.  She had routine laboratory testing performed by her primary care provider which incidentally showed polycythemia and thrombocytosis.  This changes were mild.  Her leukocyte was within normal limit.  Patient denies any prior history of any hematological disorder.  Denies any prior history of arterial venous thrombosis.  She does have a history of asthma and smokes.  No family history of any hematological disorder.  Denies any autoimmune disease.  Denies any prior history of blood transfusion.  Denies any bright red blood per rectum or melena.  Denies any prior history of iron deficiency anemia.  I been asked to assist with evaluation and management of her polycythemia and thrombocytosis.        Past Medical History:   Diagnosis Date   • Anxiety    • Arthritis    • Asthma    • Asthma    • Chest pain    • High blood pressure    • Hypertension    • Migraine         Past Surgical History:   Procedure Laterality Date   • COLONOSCOPY     • NECK SURGERY      fusion C5-6   • TOE SURGERY     • TUBAL ABDOMINAL LIGATION          Current Outpatient Medications on File Prior to Visit   Medication Sig Dispense Refill   • cyclobenzaprine (FLEXERIL) 10 MG tablet      • HYDROcodone-acetaminophen (NORCO)  MG per tablet Take 1 tablet by mouth Every 6 (Six)  Hours As Needed for Moderate Pain .     • lisinopril (PRINIVIL,ZESTRIL) 5 MG tablet Take 2.5 mg by mouth Daily.     • meloxicam (MOBIC) 15 MG tablet Take 15 mg by mouth Daily.       No current facility-administered medications on file prior to visit.         ALLERGIES:    Allergies   Allergen Reactions   • Acetaminophen Hives     With codeine     • Codeine Hives   • Simvastatin Hives   • Eggs Or Egg-Derived Products Rash        Social History     Socioeconomic History   • Marital status:      Spouse name: Not on file   • Number of children: Not on file   • Years of education: Not on file   • Highest education level: Not on file   Tobacco Use   • Smoking status: Heavy Tobacco Smoker     Packs/day: 0.50   • Smokeless tobacco: Never Used   Substance and Sexual Activity   • Alcohol use: Yes     Alcohol/week: 8.0 standard drinks     Types: 8 Cans of beer per week     Binge frequency: Weekly   • Drug use: Yes     Types: Marijuana     Comment: occassional    • Sexual activity: Defer        Family History   Problem Relation Age of Onset   • Cancer Mother    • Heart disease Mother    • Cancer Father    • Heart disease Sister    • Aneurysm Paternal Grandmother    • Hypertension Other         Review of Systems       CONSTITUTIONAL: No weight loss, fever, chills, weakness or fatigue.  HEENT: Eyes: No visual loss, blurred vision, double vision or yellow sclerae. Ears, Nose, Throat: No hearing loss, sneezing, congestion, runny nose or sore throat.  SKIN: No rash or itching.  CARDIOVASCULAR: No chest pain, chest pressure or chest discomfort. No palpitations or edema.  RESPIRATORY: No shortness of breath, cough or sputum.  GASTROINTESTINAL: No anorexia, nausea, vomiting or diarrhea. No abdominal pain or blood.  GENITOURINARY: Negative for urgency, frequency or dysuria.   NEUROLOGICAL: No headache, dizziness, syncope, paralysis, ataxia, numbness or tingling in the extremities. No change in bowel or bladder  control.  MUSCULOSKELETAL: No muscle, back pain, joint pain or stiffness.  HEMATOLOGIC: No anemia, bleeding or bruising.  LYMPHATICS: No enlarged nodes. No history of splenectomy.  PSYCHIATRIC: No history of depression or anxiety.  ENDOCRINOLOGIC: No reports of sweating, cold or heat intolerance. No polyuria or polydipsia.  ALLERGIES: No history of asthma, hives, eczema or rhinitis.      Objective     Vitals:    03/11/20 1503   BP: 171/94   Pulse: 70   Resp: 18   Temp: 97.2 °F (36.2 °C)   Weight: 81.3 kg (179 lb 4.8 oz)   PainSc: 0-No pain     Current Status 3/11/2020   ECOG score 0       Physical Exam      GENERAL: Alert, awake, oriented.  Well dressed.  Not in apparent distress. Vitals as above.   HEAD: Normocephalic, atraumatic.   EYES: PERRL, EOMI.  vision is grossly intact.  NECK: Supple, no adenopathy or thyromegaly.   THROAT: Normal oral cavity and pharynx. No inflammation, swelling, exudate, or lesions.  CARDIAC: Normal S1 and S2. No S3, S4 or murmurs. Rhythm is regular.  Extremities are warm and well perfused.   LUNGS: Clear to auscultation and percussion without rales, rhonchi, wheezing or diminished breath sounds.  ABDOMEN: Positive bowel sounds. Soft, nondistended, nontender. No guarding or rebound. No masses.  BACK:  No bony tenderness.   EXTREMITIES: No significant deformity or joint abnormality.  Peripheral pulses intact. No varicosities.  SKIN: No rash or bruising.  NEUROLOGICAL: Grossly non-focal exam. No focal weakness. Gait: Normal.   PSYCH: Mood and affect normal. No hallucination or suicidal thoughts.   LYMPHATIC: No cervical, supraclavicular or axillary adenopathy.       RECENT LABS:Independently reviewed and summarized.  Hematology WBC   Date Value Ref Range Status   03/11/2020 6.81 3.40 - 10.80 10*3/mm3 Final     RBC   Date Value Ref Range Status   03/11/2020 4.27 3.77 - 5.28 10*6/mm3 Final     Hemoglobin   Date Value Ref Range Status   03/11/2020 13.3 12.0 - 15.9 g/dL Final     Hematocrit    Date Value Ref Range Status   03/11/2020 41.0 34.0 - 46.6 % Final     Platelets   Date Value Ref Range Status   03/11/2020 362 140 - 450 10*3/mm3 Final        Ana Luisa Dos Santos reports a pain score of 0.  Given her pain assessment as noted, treatment options were discussed and the following options were decided upon as a follow-up plan to address the patient's pain: continuation of current treatment plan for pain.    Patient screened positive for depression based on a PHQ-9 score of 6 on 3/11/2020. Follow-up recommendations include: PCP managing depression.    Diagnosis:   (1) Thrombocytosis  (2) Polycythemia   (3) Encounter for tobacco use cessation counseling     All are new diagnosis/issues for me.     Assessment/Plan       (1) Thrombocytosis(2) Polycythemia     Patient with incidentally detected thrombocytopenia and polycythemia.  No prior history of arterial venous thrombosis.  She does not have any lymphadenopathy or hepatosplenomegaly.  We will repeat her CBC.  We will check a repeat level and iron studies to rule out myeloproliferative disorder as well as iron deficiency anemia.  Overall suspicious for primary hematological disorder is very low.  If any abnormality in above labs we will consider follow-up.  Otherwise we recommend she follow-up with her primary care provider.    (3) Encounter for tobacco use cessation counseling: I had 5-7 minutes discussion with the patient about smoking cessation. Problems with continued smoking including respiratory, cardiovascular and oncological problems were discussion. Advice on smoking cessation was reiterated including methods of quitting and different medications and support system available for smoking cessation was discussed.   Patient understood and was agreeable.       Thank you for involving me in Ms Dos Santos's care.     Please call us if any questions/concerns.     Isrrael Mcrae MD   Hematology Oncology

## 2020-09-21 ENCOUNTER — OFFICE VISIT (OUTPATIENT)
Dept: GASTROENTEROLOGY | Facility: CLINIC | Age: 60
End: 2020-09-21

## 2020-09-21 ENCOUNTER — HOSPITAL ENCOUNTER (OUTPATIENT)
Facility: HOSPITAL | Age: 60
Setting detail: HOSPITAL OUTPATIENT SURGERY
End: 2020-09-21
Attending: INTERNAL MEDICINE | Admitting: INTERNAL MEDICINE

## 2020-09-21 VITALS
SYSTOLIC BLOOD PRESSURE: 176 MMHG | DIASTOLIC BLOOD PRESSURE: 94 MMHG | WEIGHT: 179 LBS | HEIGHT: 66 IN | BODY MASS INDEX: 28.77 KG/M2 | HEART RATE: 64 BPM

## 2020-09-21 DIAGNOSIS — K59.04 CHRONIC IDIOPATHIC CONSTIPATION: Primary | ICD-10-CM

## 2020-09-21 DIAGNOSIS — R10.13 EPIGASTRIC PAIN: ICD-10-CM

## 2020-09-21 DIAGNOSIS — K21.00 GASTROESOPHAGEAL REFLUX DISEASE WITH ESOPHAGITIS: ICD-10-CM

## 2020-09-21 PROCEDURE — 99214 OFFICE O/P EST MOD 30 MIN: CPT | Performed by: NURSE PRACTITIONER

## 2020-09-21 RX ORDER — DEXTROSE AND SODIUM CHLORIDE 5; .45 G/100ML; G/100ML
30 INJECTION, SOLUTION INTRAVENOUS CONTINUOUS PRN
Status: CANCELLED | OUTPATIENT
Start: 2020-09-21

## 2020-09-21 RX ORDER — FLUTICASONE PROPIONATE 50 MCG
SPRAY, SUSPENSION (ML) NASAL
COMMUNITY
Start: 2020-06-26

## 2020-09-21 NOTE — PATIENT INSTRUCTIONS
Constipation, Adult  Constipation is when a person has fewer bowel movements in a week than normal, has difficulty having a bowel movement, or has stools that are dry, hard, or larger than normal. Constipation may be caused by an underlying condition. It may become worse with age if a person takes certain medicines and does not take in enough fluids.  Follow these instructions at home:  Eating and drinking    · Eat foods that have a lot of fiber, such as fresh fruits and vegetables, whole grains, and beans.  · Limit foods that are high in fat, low in fiber, or overly processed, such as french fries, hamburgers, cookies, candies, and soda.  · Drink enough fluid to keep your urine clear or pale yellow.  General instructions  · Exercise regularly or as told by your health care provider.  · Go to the restroom when you have the urge to go. Do not hold it in.  · Take over-the-counter and prescription medicines only as told by your health care provider. These include any fiber supplements.  · Practice pelvic floor retraining exercises, such as deep breathing while relaxing the lower abdomen and pelvic floor relaxation during bowel movements.  · Watch your condition for any changes.  · Keep all follow-up visits as told by your health care provider. This is important.  Contact a health care provider if:  · You have pain that gets worse.  · You have a fever.  · You do not have a bowel movement after 4 days.  · You vomit.  · You are not hungry.  · You lose weight.  · You are bleeding from the anus.  · You have thin, pencil-like stools.  Get help right away if:  · You have a fever and your symptoms suddenly get worse.  · You leak stool or have blood in your stool.  · Your abdomen is bloated.  · You have severe pain in your abdomen.  · You feel dizzy or you faint.  This information is not intended to replace advice given to you by your health care provider. Make sure you discuss any questions you have with your health care  provider.  Document Released: 09/15/2005 Document Revised: 11/30/2018 Document Reviewed: 06/07/2017  Elsevier Patient Education © 2020 [employer]

## 2020-09-21 NOTE — PROGRESS NOTES
Chief Complaint   Patient presents with   • Constipation   • Abdominal Pain       Subjective    Ana Luisa Dos Santos is a 60 y.o. female. she is here today for follow-up.  Reports she has constant abdominal pain change in bowel habits and has been taking Linzess but is not covered by her insurance.  She was last seen here April 2018 and was unable to undergo EGD and colonoscopy.  She does have personal history of colonic polyps she had rectal polyp removed in 2014.  Reports she had normal colonoscopy in [facility] in 2019.    Constipation  This is a chronic problem. The current episode started more than 1 year ago. The problem has been gradually worsening since onset. The stool is described as blood coated. The patient is on a high fiber diet. She exercises regularly. There has been adequate water intake. Associated symptoms include abdominal pain, bloating, hematochezia and hemorrhoids. Pertinent negatives include no anorexia, back pain, diarrhea (when using laxatives ), difficulty urinating, fecal incontinence, fever, flatus, melena, nausea, rectal pain, vomiting or weight loss.   Abdominal Pain  Associated symptoms include constipation and hematochezia. Pertinent negatives include no anorexia, arthralgias, diarrhea (when using laxatives ), fever, flatus, melena, nausea, vomiting or weight loss.            The following portions of the patient's history were reviewed and updated as appropriate:   Past Medical History:   Diagnosis Date   • Anxiety    • Arthritis    • Asthma    • Asthma    • Chest pain    • High blood pressure    • Hypertension    • Migraine      Past Surgical History:   Procedure Laterality Date   • COLONOSCOPY     • NECK SURGERY      fusion C5-6   • TOE SURGERY     • TUBAL ABDOMINAL LIGATION       Family History   Problem Relation Age of Onset   • Cancer Mother    • Heart disease Mother    • Cancer Father    • Heart disease Sister    • Aneurysm Paternal Grandmother    • Hypertension Other       OB History        3    Para   3    Term   3            AB        Living           SAB        TAB        Ectopic        Molar        Multiple        Live Births                  Prior to Admission medications    Medication Sig Start Date End Date Taking? Authorizing Provider   fluticasone (FLONASE) 50 MCG/ACT nasal spray  20  Yes ProviderLina MD   HYDROcodone-acetaminophen (NORCO)  MG per tablet Take 1 tablet by mouth Every 6 (Six) Hours As Needed for Moderate Pain .   Yes Emergency, Nurse Matt, RN   lisinopril (PRINIVIL,ZESTRIL) 5 MG tablet Take 2.5 mg by mouth Daily.   Yes ProviderLina MD   cyclobenzaprine (FLEXERIL) 10 MG tablet  18  ProviderLina MD   meloxicam (MOBIC) 15 MG tablet Take 15 mg by mouth Daily.  20  Emergency, Nurse Matt RN     Allergies   Allergen Reactions   • Acetaminophen Hives     With codeine     • Codeine Hives   • Simvastatin Hives   • Eggs Or Egg-Derived Products Rash     Social History     Socioeconomic History   • Marital status:      Spouse name: Not on file   • Number of children: Not on file   • Years of education: Not on file   • Highest education level: Not on file   Tobacco Use   • Smoking status: Heavy Tobacco Smoker     Packs/day: 0.50   • Smokeless tobacco: Never Used   Substance and Sexual Activity   • Alcohol use: Yes     Alcohol/week: 8.0 standard drinks     Types: 8 Cans of beer per week     Binge frequency: Weekly   • Drug use: Yes     Types: Marijuana     Comment: occassional    • Sexual activity: Defer       Review of Systems  Review of Systems   Constitutional: Positive for fatigue. Negative for activity change, appetite change, chills, diaphoresis, fever, unexpected weight change and weight loss.   HENT: Negative for sore throat and trouble swallowing.    Respiratory: Negative for shortness of breath.    Gastrointestinal: Positive for abdominal pain, bloating, blood in stool, constipation,  "hematochezia and hemorrhoids. Negative for abdominal distention, anal bleeding, anorexia, diarrhea (when using laxatives ), flatus, melena, nausea, rectal pain and vomiting.   Genitourinary: Negative for difficulty urinating.   Musculoskeletal: Negative for arthralgias and back pain.   Skin: Negative for pallor.   Neurological: Negative for light-headedness.        /94 (BP Location: Left arm)   Pulse 64   Ht 167.6 cm (66\")   Wt 81.2 kg (179 lb)   BMI 28.89 kg/m²     Objective    Physical Exam  Constitutional:       General: She is not in acute distress.     Appearance: Normal appearance. She is well-developed.   HENT:      Head: Normocephalic and atraumatic.   Neck:      Musculoskeletal: Normal range of motion and neck supple.      Thyroid: No thyromegaly.   Cardiovascular:      Rate and Rhythm: Normal rate and regular rhythm.      Heart sounds: Normal heart sounds.   Pulmonary:      Effort: Pulmonary effort is normal.      Breath sounds: Normal breath sounds. No wheezing, rhonchi or rales.   Abdominal:      General: Bowel sounds are normal. There is no distension.      Palpations: Abdomen is soft. Abdomen is not rigid.      Tenderness: There is no abdominal tenderness. There is no guarding.      Hernia: No hernia is present.   Lymphadenopathy:      Cervical: No cervical adenopathy.   Skin:     General: Skin is warm and dry.      Coloration: Skin is not pale.      Findings: No rash.   Neurological:      Mental Status: She is alert and oriented to person, place, and time.   Psychiatric:         Speech: Speech normal.         Behavior: Behavior is cooperative.       Lab on 03/11/2020   Component Date Value Ref Range Status   • Erythropoietin 03/11/2020 12.7  2.6 - 18.5 mIU/mL Final    [employer] DxI 800 Immunoassay System  Values obtained with different assay methods or kits cannot be used  interchangeably. Results cannot be interpreted as absolute evidence  of the presence or absence of " malignant disease.   • Ferritin 03/11/2020 300.40* 13.00 - 150.00 ng/mL Final   • Iron 03/11/2020 67  37 - 145 mcg/dL Final   • Iron Saturation 03/11/2020 17* 20 - 50 % Final   • Transferrin 03/11/2020 261  200 - 360 mg/dL Final   • TIBC 03/11/2020 389  298 - 536 mcg/dL Final   • WBC 03/11/2020 6.81  3.40 - 10.80 10*3/mm3 Final   • RBC 03/11/2020 4.27  3.77 - 5.28 10*6/mm3 Final   • Hemoglobin 03/11/2020 13.3  12.0 - 15.9 g/dL Final   • Hematocrit 03/11/2020 41.0  34.0 - 46.6 % Final   • MCV 03/11/2020 96.0  79.0 - 97.0 fL Final   • MCH 03/11/2020 31.1  26.6 - 33.0 pg Final   • MCHC 03/11/2020 32.4  31.5 - 35.7 g/dL Final   • RDW 03/11/2020 12.1* 12.3 - 15.4 % Final   • RDW-SD 03/11/2020 41.8  37.0 - 54.0 fl Final   • MPV 03/11/2020 9.8  6.0 - 12.0 fL Final   • Platelets 03/11/2020 362  140 - 450 10*3/mm3 Final   • Neutrophil % 03/11/2020 48.0  42.7 - 76.0 % Final   • Lymphocyte % 03/11/2020 43.8  19.6 - 45.3 % Final   • Monocyte % 03/11/2020 5.1  5.0 - 12.0 % Final   • Eosinophil % 03/11/2020 1.6  0.3 - 6.2 % Final   • Basophil % 03/11/2020 0.9  0.0 - 1.5 % Final   • Immature Grans % 03/11/2020 0.6* 0.0 - 0.5 % Final   • Neutrophils, Absolute 03/11/2020 3.27  1.70 - 7.00 10*3/mm3 Final   • Lymphocytes, Absolute 03/11/2020 2.98  0.70 - 3.10 10*3/mm3 Final   • Monocytes, Absolute 03/11/2020 0.35  0.10 - 0.90 10*3/mm3 Final   • Eosinophils, Absolute 03/11/2020 0.11  0.00 - 0.40 10*3/mm3 Final   • Basophils, Absolute 03/11/2020 0.06  0.00 - 0.20 10*3/mm3 Final   • Immature Grans, Absolute 03/11/2020 0.04  0.00 - 0.05 10*3/mm3 Final   • nRBC 03/11/2020 0.0  0.0 - 0.2 /100 WBC Final     Assessment/Plan      1. Chronic idiopathic constipation    2. Gastroesophageal reflux disease with esophagitis    3. Epigastric pain    .   We will start patient on Linzess go-ahead schedule patient for EGD due to upper abdominal pain discussed repeating colonoscopy however she has had recent colonoscopy in [place] so first we will  obtain those results     Orders placed during this encounter include:  Orders Placed This Encounter   Procedures   • Follow Anesthesia Guidelines / Standing Orders     Standing Status:   Future   • Obtain Informed Consent     Standing Status:   Future     Order Specific Question:   Informed Consent Given For     Answer:   EGD       ESOPHAGOGASTRODUODENOSCOPY (N/A)    Review and/or summary of lab tests, radiology, procedures, medications. Review and summary of old records and obtaining of history. The risks and benefits of my recommendations, as well as other treatment options were discussed with the patient today. Questions were answered.    New Medications Ordered This Visit   Medications   • linaclotide (Linzess) 145 MCG capsule capsule     Sig: Take 1 capsule by mouth Every Morning Before Breakfast.     Dispense:  30 capsule     Refill:  5       Follow-up: Return in about 4 weeks (around 10/19/2020) for After test.          This document has been electronically signed by RAFA Brooks on September 22, 2020 15:13 CDT             Results for orders placed or performed in visit on 03/11/20   CBC Auto Differential    Specimen: Blood   Result Value Ref Range    WBC 6.81 3.40 - 10.80 10*3/mm3    RBC 4.27 3.77 - 5.28 10*6/mm3    Hemoglobin 13.3 12.0 - 15.9 g/dL    Hematocrit 41.0 34.0 - 46.6 %    MCV 96.0 79.0 - 97.0 fL    MCH 31.1 26.6 - 33.0 pg    MCHC 32.4 31.5 - 35.7 g/dL    RDW 12.1 (L) 12.3 - 15.4 %    RDW-SD 41.8 37.0 - 54.0 fl    MPV 9.8 6.0 - 12.0 fL    Platelets 362 140 - 450 10*3/mm3    Neutrophil % 48.0 42.7 - 76.0 %    Lymphocyte % 43.8 19.6 - 45.3 %    Monocyte % 5.1 5.0 - 12.0 %    Eosinophil % 1.6 0.3 - 6.2 %    Basophil % 0.9 0.0 - 1.5 %    Immature Grans % 0.6 (H) 0.0 - 0.5 %    Neutrophils, Absolute 3.27 1.70 - 7.00 10*3/mm3    Lymphocytes, Absolute 2.98 0.70 - 3.10 10*3/mm3    Monocytes, Absolute 0.35 0.10 - 0.90 10*3/mm3    Eosinophils, Absolute 0.11 0.00 - 0.40 10*3/mm3    Basophils, Absolute  0.06 0.00 - 0.20 10*3/mm3    Immature Grans, Absolute 0.04 0.00 - 0.05 10*3/mm3    nRBC 0.0 0.0 - 0.2 /100 WBC   Erythropoietin    Specimen: Blood   Result Value Ref Range    Erythropoietin 12.7 2.6 - 18.5 mIU/mL   Iron Profile    Specimen: Blood   Result Value Ref Range    Iron 67 37 - 145 mcg/dL    Iron Saturation 17 (L) 20 - 50 %    Transferrin 261 200 - 360 mg/dL    TIBC 389 298 - 536 mcg/dL   Ferritin    Specimen: Blood   Result Value Ref Range    Ferritin 300.40 (H) 13.00 - 150.00 ng/mL   Results for orders placed or performed in visit on 08/04/17   ToxASSURE Select 13 (MW)    Specimen: Urine, Clean Catch   Result Value Ref Range    Report Summary FINAL    Results for orders placed or performed in visit on 06/21/17   Doppler Ankle Brachial Index Single Level CAR   Result Value Ref Range    RIGHT DORSALIS PEDIS SYS  mmHg    RIGHT POST TIBIAL SYS  mmHg    RIGHT TIARRA RATIO 1.07     LEFT DORSALIS PEDIS SYS  mmHg    LEFT POST TIBIAL SYS  mmHg    LEFT TIRARA RATIO 1.12     Upper arterial right arm brachial sys max 128 mmHg    Upper arterial left arm brachial sys max 129 mmHg   Stress Test With Myocardial Perfusion One Day   Result Value Ref Range     CV STRESS PROTOCOL 1 Pharmacologic     Stage 1 1     Duration Min Stage 1 0     Duration Sec Stage 1 10     Stress Dose Regadenoson Stage 1 0.4     Stress Comments Stage 1 10 sec bolus injection     Target HR (85%) 141 bpm    Max. Pred. HR (100%) 166 bpm    HR Stage 1 53     BP Stage 1 124/70     Baseline HR 53 bpm    Baseline /70 mmHg    Peak HR 96 bpm    Percent Max Pred HR 57.83 %    Percent Target HR 68 %    Peak /67 mmHg    Recovery HR 70 bpm    Recovery /73 mmHg    Estimated workload 1.0 METS    Nuc Stress EF 65 %   Adult Transthoracic Echo Complete   Result Value Ref Range    IVSd 1.2 cm    IVSs 1.7 cm    LVIDd 4.8 cm    LVIDs 2.7 cm    LVPWd 1.1 cm     CV ECHO LINDA - LVPWS 1.7 cm    IVS/LVPW 1.1     FS 43.8 %     EDV(Teich) 107.5 ml    ESV(Teich) 27.0 ml    EF(Teich) 74.9 %    EDV(cubed) 110.6 ml    ESV(cubed) 19.7 ml    EF(cubed) 82.2 %    % IVS thick 41.7 %    % LVPW thick 54.5 %    LV mass(C)d 206.4 grams    LV mass(C)s 173.1 grams    SV(Teich) 80.5 ml    SV(cubed) 90.9 ml    Ao root diam 2.3 cm    Ao root area 4.2 cm^2    ACS 1.9 cm    LA dimension 3.7 cm    LA/Ao 1.6     LVOT diam 2.3 cm    LVOT area 4.2 cm^2    LVOT area(traced) 4.2 cm^2    MV E max luisana 98.2 cm/sec    MV A max luisana 50.3 cm/sec    MV E/A 2.0     MV P1/2t max luisana 106.0 cm/sec    MV P1/2t 75.5 msec    MVA(P1/2t) 2.9 cm^2    MV dec slope 411.0 cm/sec^2    Ao pk luisana 132.0 cm/sec    Ao max PG 7.0 mmHg    Ao max PG (full) 2.3 mmHg    Ao V2 mean 88.0 cm/sec    Ao mean PG 4.0 mmHg    Ao mean PG (full) 2.0 mmHg    Ao V2 VTI 29.0 cm    GABRIEL(I,A) 3.2 cm^2    GABRIEL(I,D) 3.2 cm^2    GABRIEL(V,A) 3.4 cm^2    GABRIEL(V,D) 3.4 cm^2    LV V1 max PG 4.7 mmHg    LV V1 mean PG 2.0 mmHg    LV V1 max 108.0 cm/sec    LV V1 mean 67.2 cm/sec    LV V1 VTI 22.4 cm    MR max luisana 431.0 cm/sec    MR max PG 74.3 mmHg    SV(Ao) 120.5 ml    SV(LVOT) 93.1 ml    PA V2 max 74.5 cm/sec    PA max PG 2.2 mmHg    TR max luisana 299.0 cm/sec    RVSP(TR) 40.8 mmHg    RAP systole 5.0 mmHg    MVA P1/2T LCG 2.1 cm^2    Echo EF Estimated 50 %     *Note: Due to a large number of results and/or encounters for the requested time period, some results have not been displayed. A complete set of results can be found in Results Review.

## 2020-10-06 ENCOUNTER — TELEPHONE (OUTPATIENT)
Dept: GASTROENTEROLOGY | Facility: CLINIC | Age: 60
End: 2020-10-06

## 2020-10-06 NOTE — TELEPHONE ENCOUNTER
"Returned patients call and informed her linzess pa had been denied.\" You have not tried these covered drugs: dicyclomine hcl oral capsule 10 mg, tablet 20 mg, solution 10 mg/5ml, actulose oral solution 10 gm/15ml, polyethylene glycol 3350 oral packet 17 gm or polyethylene glycol 3350 oral powder 17 gm/scoop.\" Patient states she has tried all of this, however there is no documentation or record of any of these being prescribed.       ----- Message from Gwendolyn De Leon sent at 10/5/2020 10:03 AM CDT -----  Contact: [phone]  Patient states still has not received linzess medication is needing prior auth. Patient would like you to call her about this matter.      "

## 2020-10-19 RX ORDER — LUBIPROSTONE 24 UG/1
24 CAPSULE ORAL 2 TIMES DAILY WITH MEALS
Qty: 60 CAPSULE | Refills: 0 | Status: SHIPPED | OUTPATIENT
Start: 2020-10-19

## 2020-10-20 ENCOUNTER — TELEPHONE (OUTPATIENT)
Dept: GASTROENTEROLOGY | Facility: CLINIC | Age: 60
End: 2020-10-20

## 2021-03-28 ENCOUNTER — APPOINTMENT (OUTPATIENT)
Dept: GENERAL RADIOLOGY | Facility: HOSPITAL | Age: 61
End: 2021-03-28

## 2021-03-28 ENCOUNTER — APPOINTMENT (OUTPATIENT)
Dept: CT IMAGING | Facility: HOSPITAL | Age: 61
End: 2021-03-28

## 2021-03-28 ENCOUNTER — HOSPITAL ENCOUNTER (EMERGENCY)
Facility: HOSPITAL | Age: 61
Discharge: HOME | End: 2021-03-28
Attending: EMERGENCY MEDICINE | Admitting: EMERGENCY MEDICINE

## 2021-03-28 VITALS
BODY MASS INDEX: 29.67 KG/M2 | OXYGEN SATURATION: 96 % | RESPIRATION RATE: 18 BRPM | DIASTOLIC BLOOD PRESSURE: 80 MMHG | HEART RATE: 65 BPM | WEIGHT: 178.1 LBS | HEIGHT: 65 IN | TEMPERATURE: 97.8 F | SYSTOLIC BLOOD PRESSURE: 146 MMHG

## 2021-03-28 DIAGNOSIS — S16.1XXA STRAIN OF NECK MUSCLE, INITIAL ENCOUNTER: ICD-10-CM
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
## 2021-03-28 DIAGNOSIS — S29.012A STRAIN OF THORACIC BACK REGION: ICD-10-CM

## 2021-03-28 PROCEDURE — 99283 EMERGENCY DEPT VISIT LOW MDM: CPT

## 2021-03-28 PROCEDURE — 71046 X-RAY EXAM CHEST 2 VIEWS: CPT

## 2021-03-28 PROCEDURE — 72125 CT NECK SPINE W/O DYE: CPT

## 2021-03-28 PROCEDURE — 25010000002 ORPHENADRINE CITRATE PER 60 MG: Performed by: EMERGENCY MEDICINE

## 2021-03-28 PROCEDURE — 72072 X-RAY EXAM THORAC SPINE 3VWS: CPT

## 2021-03-28 PROCEDURE — 96372 THER/PROPH/DIAG INJ SC/IM: CPT

## 2021-03-28 PROCEDURE — 72100 X-RAY EXAM L-S SPINE 2/3 VWS: CPT

## 2021-03-28 RX ORDER — NAPROXEN 500 MG/1
500 TABLET ORAL ONCE
Status: COMPLETED | OUTPATIENT
Start: 2021-03-28 | End: 2021-03-28

## 2021-03-28 RX ORDER — ORPHENADRINE CITRATE 30 MG/ML
60 INJECTION INTRAMUSCULAR; INTRAVENOUS ONCE
Status: COMPLETED | OUTPATIENT
Start: 2021-03-28 | End: 2021-03-28

## 2021-03-28 RX ORDER — ETODOLAC 400 MG/1
800 TABLET, EXTENDED RELEASE ORAL DAILY PRN
Qty: 25 TABLET | Refills: 0 | OUTPATIENT
Start: 2021-03-28 | End: 2022-02-06

## 2021-03-28 RX ORDER — METHOCARBAMOL 500 MG/1
1000 TABLET, FILM COATED ORAL 4 TIMES DAILY PRN
Qty: 25 TABLET | Refills: 0 | Status: SHIPPED | OUTPATIENT
Start: 2021-03-28

## 2021-03-28 RX ADMIN — ORPHENADRINE CITRATE 60 MG: 60 INJECTION INTRAMUSCULAR; INTRAVENOUS at 13:55

## 2021-03-28 RX ADMIN — NAPROXEN 500 MG: 500 TABLET ORAL at 13:54

## 2021-04-27 ENCOUNTER — NURSE TRIAGE (OUTPATIENT)
Dept: CALL CENTER | Facility: HOSPITAL | Age: 61
End: 2021-04-27

## 2021-04-27 NOTE — TELEPHONE ENCOUNTER
"She is positive of Covid, She has a round hard knot to the bottom of the breast. Size- marble. It is tender, red under the breast.     Reason for Disposition  • Breast lump    Additional Information  • Negative: Chest pain  • Negative: Breastfeeding questions about baby  • Negative: Breastfeeding questions about mother (breast symptoms or feeling sick)  • Negative: Breastfeeding questions about mother's medicines and diet  • Negative: Postpartum breast pain and swelling, not breastfeeding  • Negative: Small spot, skin growth or mole  • Negative: [1] SEVERE breast pain AND [2] fever > 103 F (39.4 C)  • Negative: Patient sounds very sick or weak to the triager  • Negative: [1Breast looks infected (spreading redness, feels hot or painful to touch) AND [2] fever  • Negative: [1Breast looks infected (spreading redness, feels hot or painful to touch) AND [2] no fever  • Negative: [1] Painful rash AND [2] multiple small blisters grouped together (i.e., dermatomal distribution or \"band\" or \"stripe\")  • Negative: [1] Cuts, burns, or bruises of breasts AND [2] suspicious history for the injury    Answer Assessment - Initial Assessment Questions  1. SYMPTOM: \"What's the main symptom you're concerned about?\"  (e.g., lump, pain, rash, nipple discharge)      She has a knot that is hard size of marble   2. LOCATION: \"Where is the bottom of the left breast  located?\"      Left bottom  3. ONSET: \"When did *No Answer*  start?\"      Today   4. PRIOR HISTORY: \"Do you have any history of prior problems with your breasts?\" (e.g., lumps, cancer, fibrocystic breast disease)      none  5. CAUSE: \"What do you think is causing this symptom?\"      unknown  6. OTHER SYMPTOMS: \"Do you have any other symptoms?\" (e.g., fever, breast pain, redness or rash, nipple discharge)      Tender   7. PREGNANCY-BREASTFEEDING: \"Is there any chance you are pregnant?\" \"When was your last menstrual period?\" \"Are you breastfeeding?\"      n    Protocols used: " BREAST SYMPTOMS-ADULT-AH

## 2021-06-22 ENCOUNTER — HOSPITAL ENCOUNTER (EMERGENCY)
Facility: HOSPITAL | Age: 61
Discharge: HOME | End: 2021-06-22
Attending: EMERGENCY MEDICINE | Admitting: EMERGENCY MEDICINE

## 2021-06-22 VITALS
TEMPERATURE: 98 F | SYSTOLIC BLOOD PRESSURE: 157 MMHG | DIASTOLIC BLOOD PRESSURE: 78 MMHG | HEIGHT: 65 IN | BODY MASS INDEX: 28.32 KG/M2 | HEART RATE: 52 BPM | RESPIRATION RATE: 18 BRPM | WEIGHT: 170 LBS | OXYGEN SATURATION: 96 %

## 2021-06-22 DIAGNOSIS — M25.521 RIGHT ELBOW PAIN: ICD-10-CM

## 2021-06-22 DIAGNOSIS — M79.604 RIGHT LEG PAIN: ICD-10-CM

## 2021-06-22 DIAGNOSIS — M62.838 MUSCLE SPASMS OF NECK: Primary | ICD-10-CM

## 2021-06-22 PROCEDURE — 25010000002 METHYLPREDNISOLONE PER 80 MG: Performed by: NURSE PRACTITIONER

## 2021-06-22 PROCEDURE — 96372 THER/PROPH/DIAG INJ SC/IM: CPT

## 2021-06-22 PROCEDURE — 25010000002 KETOROLAC TROMETHAMINE PER 15 MG: Performed by: NURSE PRACTITIONER

## 2021-06-22 PROCEDURE — 99282 EMERGENCY DEPT VISIT SF MDM: CPT

## 2021-06-22 RX ORDER — KETOROLAC TROMETHAMINE 30 MG/ML
60 INJECTION, SOLUTION INTRAMUSCULAR; INTRAVENOUS ONCE
Status: COMPLETED | OUTPATIENT
Start: 2021-06-22 | End: 2021-06-22

## 2021-06-22 RX ORDER — METHYLPREDNISOLONE ACETATE 80 MG/ML
80 INJECTION, SUSPENSION INTRA-ARTICULAR; INTRALESIONAL; INTRAMUSCULAR; SOFT TISSUE ONCE
Status: COMPLETED | OUTPATIENT
Start: 2021-06-22 | End: 2021-06-22

## 2021-06-22 RX ADMIN — KETOROLAC TROMETHAMINE 60 MG: 30 INJECTION, SOLUTION INTRAMUSCULAR at 11:22

## 2021-06-22 RX ADMIN — METHYLPREDNISOLONE ACETATE 80 MG: 80 INJECTION, SUSPENSION INTRA-ARTICULAR; INTRALESIONAL; INTRAMUSCULAR; SOFT TISSUE at 11:22

## 2021-06-22 NOTE — DISCHARGE INSTRUCTIONS
Continue your home medications for pain. May obtain ICY HOT TENS to use as needed for comfort. Follow up with your primary care provider - call today for appointment. Return to the ER as needed.
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
## 2021-07-05 ENCOUNTER — APPOINTMENT (OUTPATIENT)
Dept: MRI IMAGING | Facility: HOSPITAL | Age: 61
End: 2021-07-05

## 2021-07-21 ENCOUNTER — APPOINTMENT (OUTPATIENT)
Dept: MRI IMAGING | Facility: HOSPITAL | Age: 61
End: 2021-07-21

## 2021-07-27 ENCOUNTER — HOSPITAL ENCOUNTER (OUTPATIENT)
Dept: MRI IMAGING | Facility: HOSPITAL | Age: 61
Discharge: HOME | End: 2021-07-27

## 2021-07-27 DIAGNOSIS — M54.9 DORSALGIA: ICD-10-CM

## 2021-07-27 DIAGNOSIS — M25.552 HIP PAIN, LEFT: ICD-10-CM

## 2021-07-27 DIAGNOSIS — M25.552 LEFT HIP PAIN: ICD-10-CM

## 2021-07-27 PROCEDURE — 72148 MRI LUMBAR SPINE W/O DYE: CPT

## 2021-07-27 PROCEDURE — 73721 MRI JNT OF LWR EXTRE W/O DYE: CPT

## 2022-01-27 ENCOUNTER — TRANSCRIBE ORDERS (OUTPATIENT)
Dept: PODIATRY | Facility: CLINIC | Age: 62
End: 2022-01-27

## 2022-01-27 DIAGNOSIS — M79.671 RIGHT FOOT PAIN: Primary | ICD-10-CM

## 2022-02-06 ENCOUNTER — HOSPITAL ENCOUNTER (EMERGENCY)
Facility: HOSPITAL | Age: 62
Discharge: HOME | End: 2022-02-06
Attending: EMERGENCY MEDICINE | Admitting: EMERGENCY MEDICINE

## 2022-02-06 ENCOUNTER — APPOINTMENT (OUTPATIENT)
Dept: GENERAL RADIOLOGY | Facility: HOSPITAL | Age: 62
End: 2022-02-06

## 2022-02-06 VITALS
OXYGEN SATURATION: 98 % | DIASTOLIC BLOOD PRESSURE: 68 MMHG | WEIGHT: 175 LBS | BODY MASS INDEX: 29.16 KG/M2 | RESPIRATION RATE: 16 BRPM | SYSTOLIC BLOOD PRESSURE: 139 MMHG | HEIGHT: 65 IN | HEART RATE: 70 BPM | TEMPERATURE: 97 F

## 2022-02-06 DIAGNOSIS — S83.91XA SPRAIN OF RIGHT KNEE, UNSPECIFIED LIGAMENT, INITIAL ENCOUNTER: Primary | ICD-10-CM

## 2022-02-06 DIAGNOSIS — M17.11 PRIMARY OSTEOARTHRITIS OF ONE KNEE, RIGHT: ICD-10-CM

## 2022-02-06 PROCEDURE — 99283 EMERGENCY DEPT VISIT LOW MDM: CPT

## 2022-02-06 PROCEDURE — 73564 X-RAY EXAM KNEE 4 OR MORE: CPT

## 2022-02-06 RX ORDER — TRAMADOL HYDROCHLORIDE 50 MG/1
50 TABLET ORAL EVERY 6 HOURS PRN
Qty: 15 TABLET | Refills: 0 | Status: SHIPPED | OUTPATIENT
Start: 2022-02-06 | End: 2022-02-10

## 2022-02-06 RX ORDER — NAPROXEN 500 MG/1
500 TABLET ORAL 2 TIMES DAILY PRN
Qty: 30 TABLET | Refills: 0 | Status: SHIPPED | OUTPATIENT
Start: 2022-02-06 | End: 2022-02-10
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
## 2022-02-07 ENCOUNTER — OFFICE VISIT (OUTPATIENT)
Dept: PODIATRY | Facility: CLINIC | Age: 62
End: 2022-02-07

## 2022-02-07 VITALS — BODY MASS INDEX: 29.16 KG/M2 | HEIGHT: 65 IN | WEIGHT: 175 LBS | OXYGEN SATURATION: 100 % | HEART RATE: 82 BPM

## 2022-02-07 DIAGNOSIS — M25.561 ACUTE PAIN OF RIGHT KNEE: Primary | ICD-10-CM

## 2022-02-07 DIAGNOSIS — M77.41 METATARSALGIA OF RIGHT FOOT: ICD-10-CM

## 2022-02-07 DIAGNOSIS — M79.671 RIGHT FOOT PAIN: Primary | ICD-10-CM

## 2022-02-07 PROCEDURE — 99203 OFFICE O/P NEW LOW 30 MIN: CPT | Performed by: PODIATRIST

## 2022-02-07 NOTE — PROGRESS NOTES
Ana Luisa Dos Santos  1960  61 y.o. female    Right foot pain    02/07/2022    Chief Complaint   Patient presents with   • Right Foot - Pain       History of Present Illness    Ana Luisa Dos Santos is a 61 y.o.female who presents to clinic with chief complaint of right foot pain.  Pain is located to the ball of the foot.  Started a few months ago.  There are no associated injuries.    Past Medical History:   Diagnosis Date   • Anxiety    • Arthritis    • Chest pain    • High blood pressure    • Hypertension    • Migraine          Past Surgical History:   Procedure Laterality Date   • COLONOSCOPY     • NECK SURGERY      fusion C5-6   • TOE SURGERY     • TUBAL ABDOMINAL LIGATION           Family History   Problem Relation Age of Onset   • Cancer Mother    • Heart disease Mother    • Cancer Father    • Heart disease Sister    • Aneurysm Paternal Grandmother    • Hypertension Other        Allergies   Allergen Reactions   • Acetaminophen Hives     With codeine     • Codeine Hives   • Simvastatin Hives   • Tylenol With Codeine #3 [Acetaminophen-Codeine] Unknown - High Severity   • Eggs Or Egg-Derived Products Rash       Social History     Socioeconomic History   • Marital status:    Tobacco Use   • Smoking status: Heavy Tobacco Smoker     Packs/day: 0.50   • Smokeless tobacco: Never Used   Substance and Sexual Activity   • Alcohol use: Yes     Alcohol/week: 8.0 standard drinks     Types: 8 Cans of beer per week   • Drug use: Yes     Types: Marijuana     Comment: occassional    • Sexual activity: Defer         Current Outpatient Medications   Medication Sig Dispense Refill   • fluticasone (FLONASE) 50 MCG/ACT nasal spray      • HYDROcodone-acetaminophen (NORCO)  MG per tablet Take 1 tablet by mouth Every 6 (Six) Hours As Needed for Moderate Pain .     • ipratropium (ATROVENT) 0.06 % nasal spray 2 sprays each nostril 3-4 times a day for cold symptoms. 1 each 0   • linaclotide (Linzess) 145 MCG capsule  "capsule Take 1 capsule by mouth Every Morning Before Breakfast. 30 capsule 5   • lisinopril (PRINIVIL,ZESTRIL) 5 MG tablet Take 10 mg by mouth Daily.     • naproxen (NAPROSYN) 500 MG tablet Take 1 tablet by mouth 2 (Two) Times a Day As Needed for Moderate Pain  (swelling). 30 tablet 0   • traMADol (ULTRAM) 50 MG tablet Take 1 tablet by mouth Every 6 (Six) Hours As Needed for Moderate Pain . 15 tablet 0   • lubiprostone (Amitiza) 24 MCG capsule Take 1 capsule by mouth 2 (Two) Times a Day With Meals. 60 capsule 0   • methocarbamol (ROBAXIN) 500 MG tablet Take 2 tablets by mouth 4 (Four) Times a Day As Needed for Muscle Spasms. 25 tablet 0   • promethazine-dextromethorphan (PROMETHAZINE-DM) 6.25-15 MG/5ML syrup Take 5 mL by mouth Every 6 (Six) Hours As Needed for Cough. 120 mL 0     No current facility-administered medications for this visit.       Review of Systems   Musculoskeletal: Positive for arthralgias and joint swelling.        Foot pain  Joint pain   All other systems reviewed and are negative.        OBJECTIVE    Pulse 82   Ht 165.1 cm (65\")   Wt 79.4 kg (175 lb)   SpO2 100%   BMI 29.12 kg/m²     Physical Exam  Vitals reviewed.   Constitutional:       General: She is not in acute distress.     Appearance: She is well-developed.   HENT:      Head: Normocephalic and atraumatic.      Nose: Nose normal.   Eyes:      Conjunctiva/sclera: Conjunctivae normal.      Pupils: Pupils are equal, round, and reactive to light.   Neck:      Trachea: No tracheal deviation.   Pulmonary:      Effort: Pulmonary effort is normal. No respiratory distress.      Breath sounds: No wheezing.   Musculoskeletal:         General: Tenderness present. No deformity. Normal range of motion.      Cervical back: Normal range of motion.   Skin:     General: Skin is warm and dry.      Capillary Refill: Capillary refill takes less than 2 seconds.   Neurological:      Mental Status: She is alert and oriented to person, place, and time.      " Deep Tendon Reflexes: Reflexes normal.   Psychiatric:         Behavior: Behavior normal.         Thought Content: Thought content normal.          Right  Lower Extremity Exam:     Cardiovascular:    Palpable pedal pulses  No erythema or edema  Musculoskeletal:  Muscle strength is WNL  ROM of the first MTP is WNL  ROM of the ankle joint is decreased in dorsiflexion  Pain on palpation to the plantar second and third metatarsal head  Dermatological:   Skin warm, dry and intact    Neurological:   Sensation intact light touch      Foot/Ankle Exam        Procedures        ASSESSMENT AND PLAN    Diagnoses and all orders for this visit:    1. Right foot pain (Primary)  -     XR Foot 3+ View Right    2. Metatarsalgia of right foot        - Patient examined and evaluated  -Radiographs taken and reviewed.  No acute osseous or articular pathology.  -Etiology and treatment of metatarsalgia discussed in detail.  Handout dispensed.  Start stretching regimen.  Recommended OTC arch support with built-in metatarsal pads.  Rx for custom arch supports with built-in metatarsal pads.  - All questions were answered   - RTC as needed            This document has been electronically signed by Taj Decker DPM on February 7, 2022 15:36 CST     2/7/2022  15:36 CST

## 2022-02-08 ENCOUNTER — PATIENT ROUNDING (BHMG ONLY) (OUTPATIENT)
Dept: PODIATRY | Facility: CLINIC | Age: 62
End: 2022-02-08

## 2022-02-08 NOTE — PROGRESS NOTES
February 8, 2022    Hello, may I speak with Ana Luisa Dos Santos?    My name is JOSE ANGEL NAYOLR    I am  with [facility]  [address]   [place] [address] [phone].    Before we get started may I verify your date of birth? 1960    I am calling to officially welcome you to our practice and ask about your recent visit. Is this a good time to talk? no    UNABLE TO CONTACT PATIENT, LEFT MESSAGE WELCOMING HER TO OUR OFFICE, AND IF SHE HAS ANY QUESTIONS OR CONCERNS SHE MAY CONTACT US [phone]  Thank you, and have a great day.

## 2022-02-08 NOTE — PROGRESS NOTES
February 8, 2022    Hello, may I speak with Ana Luisa Dos Santos?    My name is JOSE ANGEL NAYLOR    I am  with [facility]  [address]   [place] [address] [phone].    Before we get started may I verify your date of birth? 1960    I am calling to officially welcome you to our practice and ask about your recent visit. Is this a good time to talk? yes    Tell me about your visit with us. What things went well?  EVERYTHING WENT WELL, SHE WAS PLEASE WITH EVERYTHING IN THE OFFICE. DOCTOR WAS NICE       We're always looking for ways to make our patients' experiences even better. Do you have recommendations on ways we may improve?  no    Overall were you satisfied with your first visit to our practice? yes       I appreciate you taking the time to speak with me today. Is there anything else I can do for you? no      Thank you, and have a great day.

## 2022-02-09 ENCOUNTER — TRANSCRIBE ORDERS (OUTPATIENT)
Dept: PODIATRY | Facility: CLINIC | Age: 62
End: 2022-02-09

## 2022-02-09 DIAGNOSIS — M77.40 METATARSALGIA, UNSPECIFIED LATERALITY: Primary | ICD-10-CM

## 2022-02-10 ENCOUNTER — OFFICE VISIT (OUTPATIENT)
Dept: ORTHOPEDIC SURGERY | Facility: CLINIC | Age: 62
End: 2022-02-10

## 2022-02-10 VITALS — BODY MASS INDEX: 29.32 KG/M2 | WEIGHT: 176 LBS | HEIGHT: 65 IN

## 2022-02-10 DIAGNOSIS — G89.29 CHRONIC PAIN OF RIGHT KNEE: Primary | ICD-10-CM

## 2022-02-10 DIAGNOSIS — M17.11 PRIMARY OSTEOARTHRITIS OF RIGHT KNEE: ICD-10-CM

## 2022-02-10 DIAGNOSIS — M25.561 CHRONIC PAIN OF RIGHT KNEE: Primary | ICD-10-CM

## 2022-02-10 PROCEDURE — 20610 DRAIN/INJ JOINT/BURSA W/O US: CPT | Performed by: NURSE PRACTITIONER

## 2022-02-10 PROCEDURE — 99214 OFFICE O/P EST MOD 30 MIN: CPT | Performed by: NURSE PRACTITIONER

## 2022-02-10 RX ORDER — LIDOCAINE HYDROCHLORIDE 10 MG/ML
2 INJECTION, SOLUTION INFILTRATION; PERINEURAL
Status: COMPLETED | OUTPATIENT
Start: 2022-02-10 | End: 2022-02-10

## 2022-02-10 RX ORDER — MELOXICAM 7.5 MG/1
7.5 TABLET ORAL DAILY
Qty: 30 TABLET | Refills: 1 | Status: SHIPPED | OUTPATIENT
Start: 2022-02-10 | End: 2022-04-11

## 2022-02-10 RX ORDER — TRIAMCINOLONE ACETONIDE 40 MG/ML
80 INJECTION, SUSPENSION INTRA-ARTICULAR; INTRAMUSCULAR
Status: COMPLETED | OUTPATIENT
Start: 2022-02-10 | End: 2022-02-10

## 2022-02-10 RX ADMIN — LIDOCAINE HYDROCHLORIDE 2 ML: 10 INJECTION, SOLUTION INFILTRATION; PERINEURAL at 11:17

## 2022-02-10 RX ADMIN — TRIAMCINOLONE ACETONIDE 80 MG: 40 INJECTION, SUSPENSION INTRA-ARTICULAR; INTRAMUSCULAR at 11:17
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
## 2022-02-16 ENCOUNTER — HOSPITAL ENCOUNTER (OUTPATIENT)
Dept: PHYSICAL THERAPY | Facility: HOSPITAL | Age: 62
Setting detail: THERAPIES SERIES
Discharge: HOME | End: 2022-02-16

## 2022-02-16 DIAGNOSIS — M77.41 METATARSALGIA OF BOTH FEET: Primary | ICD-10-CM

## 2022-02-16 DIAGNOSIS — M77.42 METATARSALGIA OF BOTH FEET: Primary | ICD-10-CM

## 2022-02-16 PROCEDURE — 97161 PT EVAL LOW COMPLEX 20 MIN: CPT | Performed by: PHYSICAL THERAPIST
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
## 2022-05-06 ENCOUNTER — TELEPHONE (OUTPATIENT)
Dept: OBSTETRICS AND GYNECOLOGY | Facility: CLINIC | Age: 62
End: 2022-05-06

## 2024-11-06 NOTE — PATIENT INSTRUCTIONS
Constipation, Adult  Constipation is when a person:  · Poops (has a bowel movement) less than 3 times a week.  · Has a hard time pooping.  · Has poop that is dry, hard, or bigger than normal.  HOME CARE   · Eat foods with a lot of fiber in them. This includes fruits, vegetables, beans, and whole grains such as brown rice.  · Avoid fatty foods and foods with a lot of sugar. This includes french fries, hamburgers, cookies, candy, and soda.  · If you are not getting enough fiber from food, take products with added fiber in them (supplements).  · Drink enough fluid to keep your pee (urine) clear or pale yellow.  · Exercise on a regular basis, or as told by your doctor.  · Go to the restroom when you feel like you need to poop. Do not hold it.  · Only take medicine as told by your doctor. Do not take medicines that help you poop (laxatives) without talking to your doctor first.  GET HELP RIGHT AWAY IF:   · You have bright red blood in your poop (stool).  · Your constipation lasts more than 4 days or gets worse.  · You have belly (abdominal) or butt (rectal) pain.  · You have thin poop (as thin as a pencil).  · You lose weight, and it cannot be explained.  MAKE SURE YOU:   · Understand these instructions.  · Will watch your condition.  · Will get help right away if you are not doing well or get worse.     This information is not intended to replace advice given to you by your health care provider. Make sure you discuss any questions you have with your health care provider.     Miralax one scoop daily    Colace is a stool softener- can take twice daily    Fiber supplement- mix with liquid    Hydration, activity, and regular habits    Zantac (rantadine 150mg) once or twice a day. Tagament/Pepcid is the same for reflux symptoms.      Exfoliation Type: exfoliant Comments (Sticky): Cleanse with gentle wash. Remove with hot towel. Exfo polish. Polish activator. Remove with hot towel. Cleanse with gentle wash. Remove with hot towel. Warm 4x4. Dry 4x4. Massage oil to face. Dermaplane. Cool 4x4. Brightening sheet mask with ice globes. Massage oil to neck, shoulder and scalp. Hot towel. Complexion serum. Daily power. Growth factor. Non tinted spf. Mask Type (Optional): collagen Treatment Type (Optional): Spa Facial Detail Level: Zone